# Patient Record
Sex: FEMALE | Race: WHITE | Employment: PART TIME | URBAN - METROPOLITAN AREA
[De-identification: names, ages, dates, MRNs, and addresses within clinical notes are randomized per-mention and may not be internally consistent; named-entity substitution may affect disease eponyms.]

---

## 2020-12-08 ENCOUNTER — VIRTUAL VISIT (OUTPATIENT)
Dept: FAMILY MEDICINE CLINIC | Age: 30
End: 2020-12-08

## 2020-12-08 PROCEDURE — 99203 OFFICE O/P NEW LOW 30 MIN: CPT | Performed by: NURSE PRACTITIONER

## 2020-12-08 RX ORDER — NIFEDIPINE 60 MG/1
60 TABLET, EXTENDED RELEASE ORAL DAILY
COMMUNITY
Start: 2020-11-15 | End: 2021-03-01 | Stop reason: SDUPTHER

## 2020-12-08 RX ORDER — FLUOXETINE 20 MG/1
20 TABLET, FILM COATED ORAL DAILY
COMMUNITY
Start: 2020-10-23 | End: 2020-12-08

## 2020-12-08 RX ORDER — LEVOTHYROXINE SODIUM 0.2 MG/1
200 TABLET ORAL DAILY
COMMUNITY
End: 2021-03-04 | Stop reason: SDUPTHER

## 2020-12-08 RX ORDER — LEVOTHYROXINE SODIUM 0.03 MG/1
TABLET ORAL
COMMUNITY
Start: 2020-11-25 | End: 2021-03-04

## 2020-12-08 RX ORDER — M-VIT,TX,IRON,MINS/CALC/FOLIC 27MG-0.4MG
1 TABLET ORAL DAILY
COMMUNITY
End: 2022-05-31

## 2020-12-08 RX ORDER — FLUOXETINE HYDROCHLORIDE 40 MG/1
40 CAPSULE ORAL DAILY
Qty: 30 CAPSULE | Refills: 3 | Status: SHIPPED | OUTPATIENT
Start: 2020-12-08 | End: 2021-03-04

## 2020-12-08 RX ORDER — TRAZODONE HYDROCHLORIDE 50 MG/1
50 TABLET ORAL NIGHTLY PRN
Qty: 30 TABLET | Refills: 5 | Status: SHIPPED | OUTPATIENT
Start: 2020-12-08 | End: 2021-05-19

## 2020-12-08 ASSESSMENT — PATIENT HEALTH QUESTIONNAIRE - PHQ9
SUM OF ALL RESPONSES TO PHQ9 QUESTIONS 1 & 2: 2
SUM OF ALL RESPONSES TO PHQ QUESTIONS 1-9: 2
SUM OF ALL RESPONSES TO PHQ QUESTIONS 1-9: 2
1. LITTLE INTEREST OR PLEASURE IN DOING THINGS: 2
SUM OF ALL RESPONSES TO PHQ QUESTIONS 1-9: 2
2. FEELING DOWN, DEPRESSED OR HOPELESS: 0

## 2020-12-08 ASSESSMENT — ENCOUNTER SYMPTOMS
ABDOMINAL PAIN: 0
SHORTNESS OF BREATH: 0

## 2020-12-08 NOTE — PROGRESS NOTES
2020    TELEHEALTH EVALUATION -- Audio/Visual (During OIDEQ-25 public health emergency)    HPI:    Linda Horta (:  [id number]) has requested an audio/video evaluation for the following concern(s):    Hypothyroidism  X 15 years  If misses 1-2 days TSH becomes abnormal ; has not missed any doses  Usually checks TSH 1x/month  Is on 225 mcg of synthroid  FH: Father, sisters have hypothyroidism    Preeclampsia  With 1st pregnancy- never went away after  Does not check BP at home- is good when comes in the office per patient  Takes procardia    Anxiety  Mother passed away (from OD)  when she was 17 yo; in [address] (in house rehab)  Dwells on things  Irrational fears  Any sickness automatically thinks is cancer  Panic attacks  Is going through a divorce- is afraid she is going to lose her kids  Thinks about this all day every day  Denies SI  Prozac- helps; x 1 month; wants to increase (is on 20 mg)- takes the edge off  Celexa- was not helping anymore  Previously was on Geodon    Review of Systems   Constitutional: Negative for activity change, appetite change, fatigue and fever. Respiratory: Negative for shortness of breath. Cardiovascular: Negative for chest pain. Gastrointestinal: Negative for abdominal pain. Neurological: Negative for dizziness and headaches. Psychiatric/Behavioral: Positive for sleep disturbance. Negative for suicidal ideas. The patient is nervous/anxious. Prior to Visit Medications    Medication Sig Taking?  Authorizing Provider   levothyroxine (SYNTHROID) 25 MCG tablet TK 1 T PO QD IN THE MORNING OES Yes Historical Provider, MD   NIFEdipine (PROCARDIA XL) 60 MG extended release tablet Take 60 mg by mouth daily Yes Historical Provider, MD   Ascorbic Acid (VITAMIN C PO) Take by mouth Yes Historical Provider, MD   Multiple Vitamins-Minerals (THERAPEUTIC MULTIVITAMIN-MINERALS) tablet Take 1 tablet by mouth daily Yes Historical Provider, MD   FLUoxetine (PROZAC) 40 MG capsule Take 1 capsule by mouth daily Yes SYLVIA Neal CNP   traZODone (DESYREL) 50 MG tablet Take 1 tablet by mouth nightly as needed for Sleep Yes SYLVIA Neal CNP   levothyroxine (SYNTHROID) 200 MCG tablet Take 200 mcg by mouth Daily Take 225 mcg daily Yes Historical Provider, MD       Social History     Tobacco Use    Smoking status: Never Smoker    Smokeless tobacco: Never Used   Substance Use Topics    Alcohol use: Yes    Drug use: Never            PHYSICAL EXAMINATION:  [ INSTRUCTIONS:  \"[x]\" Indicates a positive item  \"[]\" Indicates a negative item  -- DELETE ALL ITEMS NOT EXAMINED]  Vital Signs: (As obtained by patient/caregiver or practitioner observation)    Blood pressure-  Heart rate-    Respiratory rate-    Temperature-  Pulse oximetry-     Constitutional: [x] Appears well-developed and well-nourished [x] No apparent distress      [] Abnormal-   Mental status  [x] Alert and awake  [x] Oriented to person/place/time [x]Able to follow commands      Eyes:  EOM    []  Normal  [] Abnormal-  Sclera  []  Normal  [] Abnormal -         Discharge []  None visible  [] Abnormal -    HENT:   [x] Normocephalic, atraumatic.   [] Abnormal   [] Mouth/Throat: Mucous membranes are moist.     External Ears [] Normal  [] Abnormal-     Neck: [] No visualized mass     Pulmonary/Chest: [x] Respiratory effort normal.  [x] No visualized signs of difficulty breathing or respiratory distress        [] Abnormal-      Musculoskeletal:   [] Normal gait with no signs of ataxia         [] Normal range of motion of neck        [] Abnormal-       Neurological:        [] No Facial Asymmetry (Cranial nerve 7 motor function) (limited exam to video visit)          [] No gaze palsy        [] Abnormal-         Skin:        [] No significant exanthematous lesions or discoloration noted on facial skin         [] Abnormal-            Psychiatric:       [x] Normal Affect [] No Hallucinations        [] Abnormal- Other pertinent observable physical exam findings-     ASSESSMENT/PLAN:  1. Anxiety  Stable; Increase Prozac. - FLUoxetine (PROZAC) 40 MG capsule; Take 1 capsule by mouth daily  Dispense: 30 capsule; Refill: 3    2. Hypothyroidism, unspecified type  Stable;  Continue current regimen. TSH ordered. - TSH with Reflex; Future    3. Difficulty sleeping  Stable;  Continue current regimen. - traZODone (DESYREL) 50 MG tablet; Take 1 tablet by mouth nightly as needed for Sleep  Dispense: 30 tablet; Refill: 5    4. History of pre-eclampsia  Stable;  Continue current regimen. Return in about 4 weeks (around 1/5/2021). Blaze Porter is a 34 y.o. female being evaluated by a Virtual Visit (video visit) encounter to address concerns as mentioned above. A caregiver was present when appropriate. Due to this being a TeleHealth encounter ([facility]), evaluation of the following organ systems was limited: Vitals/Constitutional/EENT/Resp/CV/GI//MS/Neuro/Skin/Heme-Lymph-Imm. Pursuant to the emergency declaration under the [address] [place] [address] authority and the [employer] and [employer] Act, this Virtual Visit was conducted with patient's (and/or legal guardian's) consent, to reduce the patient's risk of exposure to COVID-19 and provide necessary medical care. The patient (and/or legal guardian) has also been advised to contact this office for worsening conditions or problems, and seek emergency medical treatment and/or call 911 if deemed necessary. Patient identification was verified at the start of the visit: Yes    Total time spent on this encounter: Not billed by time    Services were provided through a video synchronous discussion virtually to substitute for in-person clinic visit. Patient and provider were located at their individual homes.     --SYLVIA Krishnan - CNP on 12/8/2020 at 9:08 PM    An electronic signature was used to authenticate this note.

## 2020-12-14 DIAGNOSIS — E03.9 HYPOTHYROIDISM, UNSPECIFIED TYPE: ICD-10-CM

## 2020-12-14 LAB
T3 TOTAL: 1.21 NG/ML (ref 0.8–2)
T4 FREE: 1.6 NG/DL (ref 0.9–1.8)
TSH REFLEX: 0.2 UIU/ML (ref 0.27–4.2)

## 2021-01-19 ENCOUNTER — TELEPHONE (OUTPATIENT)
Dept: FAMILY MEDICINE CLINIC | Age: 31
End: 2021-01-19

## 2021-01-19 NOTE — TELEPHONE ENCOUNTER
----- Message from Chantal Caballero sent at 1/19/2021 11:52 AM EST -----  Subject: Message to Provider    QUESTIONS  Information for Provider? Patient is taking trazodone to help her sleep. Her grandmother is actively dying and she is not sleeping well with everything going on and is wanting to know if her dosage can be upped to a stronger dose.   ---------------------------------------------------------------------------  --------------  CALL BACK INFO  What is the best way for the office to contact you? OK to leave message on   voicemail  Preferred Call Back Phone Number? [phone]  ---------------------------------------------------------------------------  --------------  SCRIPT ANSWERS  Relationship to Patient?  Self

## 2021-02-05 ENCOUNTER — OFFICE VISIT (OUTPATIENT)
Dept: FAMILY MEDICINE CLINIC | Age: 31
End: 2021-02-05

## 2021-02-05 VITALS
WEIGHT: 293 LBS | OXYGEN SATURATION: 96 % | HEART RATE: 91 BPM | BODY MASS INDEX: 50.02 KG/M2 | SYSTOLIC BLOOD PRESSURE: 116 MMHG | HEIGHT: 64 IN | DIASTOLIC BLOOD PRESSURE: 84 MMHG

## 2021-02-05 DIAGNOSIS — F41.9 ANXIETY: Primary | ICD-10-CM

## 2021-02-05 DIAGNOSIS — R22.9 SKIN NODULE: ICD-10-CM

## 2021-02-05 DIAGNOSIS — G47.9 DIFFICULTY SLEEPING: ICD-10-CM

## 2021-02-05 DIAGNOSIS — L70.0 ACNE VULGARIS: ICD-10-CM

## 2021-02-05 PROCEDURE — 99213 OFFICE O/P EST LOW 20 MIN: CPT | Performed by: NURSE PRACTITIONER

## 2021-02-05 RX ORDER — FLUOXETINE HYDROCHLORIDE 20 MG/1
20 CAPSULE ORAL DAILY
Qty: 30 CAPSULE | Refills: 0 | Status: SHIPPED | OUTPATIENT
Start: 2021-02-05 | End: 2021-03-04

## 2021-02-05 RX ORDER — TRETINOIN 0.5 MG/G
CREAM TOPICAL
Qty: 20 G | Refills: 0 | Status: SHIPPED | OUTPATIENT
Start: 2021-02-05 | End: 2021-03-07

## 2021-02-05 SDOH — ECONOMIC STABILITY: FOOD INSECURITY: WITHIN THE PAST 12 MONTHS, THE FOOD YOU BOUGHT JUST DIDN'T LAST AND YOU DIDN'T HAVE MONEY TO GET MORE.: NEVER TRUE

## 2021-02-05 ASSESSMENT — PATIENT HEALTH QUESTIONNAIRE - PHQ9
SUM OF ALL RESPONSES TO PHQ QUESTIONS 1-9: 2
SUM OF ALL RESPONSES TO PHQ9 QUESTIONS 1 & 2: 2
2. FEELING DOWN, DEPRESSED OR HOPELESS: 1
1. LITTLE INTEREST OR PLEASURE IN DOING THINGS: 1

## 2021-02-05 NOTE — PROGRESS NOTES
Antony Benson (:  ) is a 31 y.o. female,Established patient, here for evaluation of the following chief complaint(s):  Acne, Insomnia, and Mass (inside right thigh )      ASSESSMENT/PLAN:  1. Anxiety  Stable; Ok to increase Prozac. Added 20 mg to 40 mg capsule since was just refilled. Will change next month to 3-20 mg capsules if dose is good for patient. 2. Difficulty sleeping  Stable;  Continue 100 mg trazodone. 3. Acne vulgaris  Stable;  Encouraged patient to use cetaphil to wash and spot treat with Retin-A cream.  -     tretinoin (RETIN-A) 0.05 % cream; Apply topically nightly., Disp-20 g, R-0, Normal  4. Skin nodule  Stable;  Discussed DD including lipoma. US ordered. -     US SOFT TISSUE LIMITED AREA; Future      Return in about 4 weeks (around 3/5/2021). SUBJECTIVE/OBJECTIVE:  HPI  A lot of stress: grandmother- was in the hospital x 3 weeks, intubated, COVID negative; came home and is now is at [employer] with kids (10 yo daughter and 2 yo son)- is a stressor  Would like to increase Prozac  Increased trazodone 100 mg nightly- has been helpful    Acne  Face  Can come to a white head  Acne wash and moisturizer  Neutotrogenia spot treatment    Back of right thigh  Does not stick out unless she feels for it  Not painful  No redness or drainage  Has had as long as she can remember    Review of Systems    Physical Exam  Vitals signs reviewed. Constitutional:       Appearance: Normal appearance. HENT:      Head: Normocephalic. Cardiovascular:      Rate and Rhythm: Normal rate and regular rhythm. Pulses: Normal pulses. Heart sounds: Normal heart sounds, S1 normal and S2 normal.   Pulmonary:      Effort: Pulmonary effort is normal.      Breath sounds: Normal breath sounds and air entry. Musculoskeletal:      Right lower leg: No edema. Left lower leg: No edema. Skin:     Findings: Acne present. Comments: Nodule palpated; not painful, borders delineated   Neurological:      Mental Status: She is alert. Psychiatric:         Mood and Affect: Mood normal.         An electronic signature was used to authenticate this note.     --SYLVIA Swift - CNP

## 2021-02-06 ENCOUNTER — HOSPITAL ENCOUNTER (OUTPATIENT)
Dept: ULTRASOUND IMAGING | Age: 31
Discharge: HOME | End: 2021-02-06

## 2021-02-06 DIAGNOSIS — R22.9 SKIN NODULE: ICD-10-CM

## 2021-02-06 PROCEDURE — 76999 ECHO EXAMINATION PROCEDURE: CPT

## 2021-02-17 ENCOUNTER — TELEPHONE (OUTPATIENT)
Dept: FAMILY MEDICINE CLINIC | Age: 31
End: 2021-02-17

## 2021-02-17 NOTE — TELEPHONE ENCOUNTER
Message  Received: Today  Message Contents   [facility]ane B[facility] [address] Support             Subject: Message to Provider     QUESTIONS   Information for Provider? pt wants to know if there are any other tests   that can be done such as a CTSCAN for the lump on her leg. She said    knows she has extreme anxiety and this is really bothering her   ---------------------------------------------------------------------------   --------------   CALL BACK INFO   What is the best way for the office to contact you? OK to leave message on   voicemail   Preferred Call Back Phone Number? [phone]   ---------------------------------------------------------------------------   --------------   SCRIPT ANSWERS   Relationship to Patient?  Self

## 2021-03-01 RX ORDER — FLUOXETINE HYDROCHLORIDE 20 MG/1
60 CAPSULE ORAL DAILY
Qty: 90 CAPSULE | Refills: 0 | Status: SHIPPED | OUTPATIENT
Start: 2021-03-01 | End: 2021-04-21 | Stop reason: SDUPTHER

## 2021-03-01 RX ORDER — NIFEDIPINE 60 MG/1
60 TABLET, EXTENDED RELEASE ORAL DAILY
Qty: 30 TABLET | Refills: 0 | Status: SHIPPED | OUTPATIENT
Start: 2021-03-01 | End: 2021-03-02

## 2021-03-01 NOTE — TELEPHONE ENCOUNTER
From Juan Pablo Cali:   Pt also said she would like to have the order for her MRI.   Please call and advise once completed.

## 2021-03-01 NOTE — TELEPHONE ENCOUNTER
1. Prozac 20mg - 3 capsules daily pending  Anxiety  Stable; Ok to increase Prozac. Added 20 mg to 40 mg capsule since was just refilled. Will change next month to 3-20 mg capsules if dose is good for patient. 2. Procardia pending    3.  Please advise on acne medication    (MRI message sent in separate message)

## 2021-03-02 RX ORDER — NIFEDIPINE 60 MG/1
60 TABLET, EXTENDED RELEASE ORAL DAILY
Qty: 90 TABLET | Refills: 0 | Status: SHIPPED | OUTPATIENT
Start: 2021-03-02 | End: 2021-03-04 | Stop reason: SDUPTHER

## 2021-03-03 NOTE — TELEPHONE ENCOUNTER
----- Message from Negrita De La Cruz sent at 3/3/2021 12:44 PM EST -----  Subject: Message to Provider    QUESTIONS  Information for Provider? Patient would like a return call regarding MRI referral and prescription for Synthroid. Would also like to get a prescription for Zofram as she has been nauseated. ---------------------------------------------------------------------------  --------------  Kristine REDDY  What is the best way for the office to contact you? OK to leave message on   voicemail  Preferred Call Back Phone Number? [phone]  ---------------------------------------------------------------------------  --------------  SCRIPT ANSWERS  Relationship to Patient?  Self

## 2021-03-04 DIAGNOSIS — R22.9 SKIN NODULE: Primary | ICD-10-CM

## 2021-03-04 RX ORDER — NIFEDIPINE 60 MG/1
60 TABLET, EXTENDED RELEASE ORAL DAILY
Qty: 90 TABLET | Refills: 0 | Status: SHIPPED | OUTPATIENT
Start: 2021-03-04 | End: 2021-11-11 | Stop reason: SDUPTHER

## 2021-03-04 RX ORDER — LEVOTHYROXINE SODIUM 0.2 MG/1
200 TABLET ORAL DAILY
Qty: 90 TABLET | Refills: 0 | Status: SHIPPED | OUTPATIENT
Start: 2021-03-04 | End: 2021-05-20 | Stop reason: SDUPTHER

## 2021-03-04 RX ORDER — ONDANSETRON 4 MG/1
4 TABLET, FILM COATED ORAL 3 TIMES DAILY PRN
Qty: 15 TABLET | Refills: 0 | Status: SHIPPED | OUTPATIENT
Start: 2021-03-04 | End: 2021-08-06 | Stop reason: ALTCHOICE

## 2021-03-04 NOTE — TELEPHONE ENCOUNTER
Discussed referral to general surgery for evaluation vs. MRI. Sent medications to pharmacy.   Patient aware

## 2021-03-16 PROBLEM — F41.9 ANXIETY: Status: ACTIVE | Noted: 2021-03-16

## 2021-03-16 PROBLEM — A60.00 GENITAL HSV: Status: ACTIVE | Noted: 2018-10-25

## 2021-03-16 PROBLEM — E03.9 HYPOTHYROIDISM: Status: ACTIVE | Noted: 2021-03-16

## 2021-03-16 PROBLEM — A60.00 GENITAL HSV: Status: RESOLVED | Noted: 2018-10-25 | Resolved: 2021-03-16

## 2021-03-18 ENCOUNTER — VIRTUAL VISIT (OUTPATIENT)
Dept: FAMILY MEDICINE CLINIC | Age: 31
End: 2021-03-18

## 2021-03-18 DIAGNOSIS — E03.9 HYPOTHYROIDISM, UNSPECIFIED TYPE: ICD-10-CM

## 2021-03-18 DIAGNOSIS — F41.9 ANXIETY: ICD-10-CM

## 2021-03-18 DIAGNOSIS — R53.83 FATIGUE, UNSPECIFIED TYPE: Primary | ICD-10-CM

## 2021-03-18 PROCEDURE — 99213 OFFICE O/P EST LOW 20 MIN: CPT | Performed by: NURSE PRACTITIONER

## 2021-03-18 NOTE — PROGRESS NOTES
Yaya Harper (:  ) is a 31 y.o. female,Established patient, here for evaluation of the following chief complaint(s): Fatigue      ASSESSMENT/PLAN:  1. Fatigue, unspecified type  Stable;  Labs ordered. Encouraged patient to stop melatonin as it might be lingering- slept better when just took trazodone.  -     TSH without Reflex; Future  -     Vitamin B12; Future  -     Vitamin D 25 Hydroxy; Future  -     CBC; Future  2. Hypothyroidism, unspecified type  Stable;  Recheck. 3. Anxiety  Stable;  Continue current regimen. No follow-ups on file. SUBJECTIVE/OBJECTIVE:  HPI  A lot of fatigue  Grandmother passed- grief  To get to sleep- takes melatonin, trazodone; sometimes one or the other- ? Melatonin is lingering  Forgets to take B12 supplement- eats meat; takes 1000 mcg- has been low in the past  No heavy menses or blood in her stool    Anxiety   Does not feel like it has been worse  They were there when she passed; has been sick in the hospital 2 weeks prior- does not think she is grieving like she thought she was going to ( of pneumonia, CHF, sepsis)  Mom  when she was 17 yo; 17 yo- went mentally crazy- hospitalized by SI  Takes 60 mg Prozac    Hypothyroidism  Takes synthroid  Hasn't had blood work completed- previously needed to decrease dose    Review of Systems    Patient-Reported Vitals 2020   Patient-Reported Weight 320 lb   Patient-Reported Height 5' 4\"        Physical Exam  Constitutional:       Appearance: Normal appearance. HENT:      Head: Normocephalic. Right Ear: External ear normal.      Left Ear: External ear normal.      Nose: Nose normal.   Pulmonary:      Effort: No respiratory distress. Neurological:      Mental Status: She is alert.    Psychiatric:         Attention and Perception: Attention normal.         Mood and Affect: Mood normal.         Speech: Speech normal.         Behavior: Behavior normal.         Hedy Khan, was evaluated through a synchronous (real-time) audio-video encounter. The patient (or guardian if applicable) is aware that this is a billable service. Verbal consent to proceed has been obtained within the past 12 months. The visit was conducted pursuant to the emergency declaration under the [address] [place] [address], [address] [place] [address] authority and the [employer] and [employer] Act. Patient identification was verified, and a caregiver was present when appropriate. The patient was located in a state where the provider was credentialed to provide care. An electronic signature was used to authenticate this note.     --Moise Pineda, SYLVIA - CNP

## 2021-03-22 DIAGNOSIS — R53.83 FATIGUE, UNSPECIFIED TYPE: ICD-10-CM

## 2021-03-23 LAB
HCT VFR BLD CALC: 39.3 % (ref 36–48)
HEMOGLOBIN: 13 G/DL (ref 12–16)
MCH RBC QN AUTO: 29.7 PG (ref 26–34)
MCHC RBC AUTO-ENTMCNC: 33.2 G/DL (ref 31–36)
MCV RBC AUTO: 89.5 FL (ref 80–100)
PDW BLD-RTO: 14.6 % (ref 12.4–15.4)
PLATELET # BLD: 183 K/UL (ref 135–450)
PMV BLD AUTO: 10 FL (ref 5–10.5)
RBC # BLD: 4.4 M/UL (ref 4–5.2)
TSH SERPL DL<=0.05 MIU/L-ACNC: 5.47 UIU/ML (ref 0.27–4.2)
VITAMIN B-12: 618 PG/ML (ref 211–911)
VITAMIN D 25-HYDROXY: 21.1 NG/ML
WBC # BLD: 6.3 K/UL (ref 4–11)

## 2021-03-24 ENCOUNTER — TELEPHONE (OUTPATIENT)
Dept: FAMILY MEDICINE CLINIC | Age: 31
End: 2021-03-24

## 2021-03-24 NOTE — TELEPHONE ENCOUNTER
TSH is now a little high. Has she changed how she is taking the medication? Is she taking 30 minutes before eating or drinking? I know last time her TSH was low, if she hasn't changed anything we need to add a little more medication.

## 2021-03-25 NOTE — TELEPHONE ENCOUNTER
Pt called back. Also wanted to know if she should have her a1c checked. States that it hasn't been checked in a while and there is a family hx of diabetes.

## 2021-03-25 NOTE — TELEPHONE ENCOUNTER
Pt is taking medication correctly  At night before she goes to bed  Pt feels extremely tired concerned she may have  mono she is so tired   Please advise

## 2021-03-25 NOTE — TELEPHONE ENCOUNTER
When she takes her medication at night is it at least 3 hours after her evening meal?  Is she ok if I increase? No, I did not check mono labs or A1c.   Does she want me to add?

## 2021-03-26 DIAGNOSIS — E03.9 HYPOTHYROIDISM, UNSPECIFIED TYPE: Primary | ICD-10-CM

## 2021-03-26 DIAGNOSIS — E03.9 HYPOTHYROIDISM, UNSPECIFIED TYPE: ICD-10-CM

## 2021-03-26 RX ORDER — LEVOTHYROXINE SODIUM 0.03 MG/1
12.5 TABLET ORAL DAILY
Qty: 15 TABLET | Refills: 1 | Status: SHIPPED | OUTPATIENT
Start: 2021-03-26 | End: 2021-03-26

## 2021-03-26 RX ORDER — LEVOTHYROXINE SODIUM 0.03 MG/1
TABLET ORAL
Qty: 45 TABLET | Refills: 0 | Status: SHIPPED | OUTPATIENT
Start: 2021-03-26 | End: 2021-06-01

## 2021-03-26 NOTE — TELEPHONE ENCOUNTER
Has never waited 3 hrs prior to taking.  - has been taking this medication for over 15 yrs this way. What are you recommendations? Should it be lowered? Wants to wait on the mono/a1c testing - will try the vitamin d first to see if it works.

## 2021-04-05 ENCOUNTER — TELEPHONE (OUTPATIENT)
Dept: FAMILY MEDICINE CLINIC | Age: 31
End: 2021-04-05

## 2021-04-06 DIAGNOSIS — R53.83 FATIGUE, UNSPECIFIED TYPE: Primary | ICD-10-CM

## 2021-04-06 NOTE — TELEPHONE ENCOUNTER
Pt is requesting glucose labs be done. Stated that she wanted to know if they needed to be done but would like to do it just to ease her mind.    Please advise pt   Phone # [phone]

## 2021-04-06 NOTE — TELEPHONE ENCOUNTER
I had previously discussed with patient and she wanted an A1C because of family history and her previous symptoms.

## 2021-04-06 NOTE — TELEPHONE ENCOUNTER
[phone] (home)   Left message for patient to call back. Hemoglobin A1C order is in her chart to have completed when convenient for her. Doesn't need to be fasting for this. Will need to clarify with the lab if she is wanting the tsh or a1c when she goes to ensure we get the right test completed.

## 2021-04-07 DIAGNOSIS — R53.83 FATIGUE, UNSPECIFIED TYPE: ICD-10-CM

## 2021-04-07 DIAGNOSIS — E03.9 HYPOTHYROIDISM, UNSPECIFIED TYPE: ICD-10-CM

## 2021-04-07 NOTE — TELEPHONE ENCOUNTER
[phone] (home)   Left message for patient to call back. Hemoglobin A1C order is in her chart to have completed when convenient for her. Doesn't need to be fasting for this.    Will need to clarify with the lab if she is wanting the tsh or a1c when she goes to ensure we get the right test completed

## 2021-04-08 LAB
ESTIMATED AVERAGE GLUCOSE: 105.4 MG/DL
HBA1C MFR BLD: 5.3 %

## 2021-04-15 ENCOUNTER — OFFICE VISIT (OUTPATIENT)
Dept: SURGERY | Age: 31
End: 2021-04-15

## 2021-04-15 VITALS — BODY MASS INDEX: 56.47 KG/M2 | SYSTOLIC BLOOD PRESSURE: 128 MMHG | DIASTOLIC BLOOD PRESSURE: 79 MMHG | WEIGHT: 293 LBS

## 2021-04-15 DIAGNOSIS — D17.23 LIPOMA OF RIGHT THIGH: Primary | ICD-10-CM

## 2021-04-15 PROCEDURE — 99242 OFF/OP CONSLTJ NEW/EST SF 20: CPT | Performed by: SURGERY

## 2021-04-15 NOTE — PROGRESS NOTES
TAKE WITH 200MCG TABLET 3/26/21   SYLVIA Mantilla CNP   ondansetron [facility]) 4 MG tablet Take 1 tablet by mouth 3 times daily as needed for Nausea or Vomiting 3/4/21   SYLVIA Mantilla CNP   NIFEdipine (PROCARDIA XL) 60 MG extended release tablet Take 1 tablet by mouth daily 3/4/21 4/3/21  SYLVIA Mantilla CNP   levothyroxine (SYNTHROID) 200 MCG tablet Take 1 tablet by mouth Daily 3/4/21   SYLVIA Mantilla CNP   FLUoxetine (PROZAC) 20 MG capsule Take 3 capsules by mouth daily 3/1/21 3/31/21  SYLVIA Mantilla CNP   Ascorbic Acid (VITAMIN C PO) Take by mouth    Historical Provider, MD   Multiple Vitamins-Minerals (THERAPEUTIC MULTIVITAMIN-MINERALS) tablet Take 1 tablet by mouth daily    Historical Provider, MD   traZODone (DESYREL) 50 MG tablet Take 1 tablet by mouth nightly as needed for Sleep 12/8/20   SYLVIA Mantilla CNP         No Known Allergies    Social History     Socioeconomic History    Marital status:      Spouse name: Not on file    Number of children: Not on file    Years of education: Not on file    Highest education level: Not on file   Occupational History    Not on file   Social Needs    Financial resource strain: Not hard at all    Food insecurity     Worry: Never true     Inability: Never true   [employer] needs     Medical: Not on file     Non-medical: Not on file   Tobacco Use    Smoking status: Never Smoker    Smokeless tobacco: Never Used   Substance and Sexual Activity    Alcohol use:  Yes    Drug use: Never    Sexual activity: Yes     Partners: Male   Lifestyle    Physical activity     Days per week: Not on file     Minutes per session: Not on file    Stress: Not on file   Relationships    Social connections     Talks on phone: Not on file     Gets together: Not on file     Attends [employer] service: Not on file     Active member of club or organization: Not on file     Attends meetings of clubs or organizations: Not on file     Relationship status: Not on file    Intimate partner violence     Fear of current or ex partner: Not on file     Emotionally abused: Not on file     Physically abused: Not on file     Forced sexual activity: Not on file   Other Topics Concern    Not on file   Social History Narrative    Not on file       Family History   Problem Relation Age of Onset    Obesity Mother     High Blood Pressure Mother     Heart Disease Father     Obesity Father     High Blood Pressure Father     Heart Disease Maternal Grandmother     Diabetes Maternal Grandmother     No Known Problems Maternal Grandfather     No Known Problems Paternal Grandmother     No Known Problems Paternal Grandfather        Review of Systems   Constitutional: Negative. Skin: Negative. All other systems reviewed and are negative. Objective:   Physical Exam  Vitals signs reviewed. Exam conducted with a chaperone present. Constitutional:       General: She is not in acute distress. Appearance: She is well-developed. She is not diaphoretic. HENT:      Head: Normocephalic and atraumatic. Right Ear: External ear normal.      Left Ear: External ear normal.      Nose: Nose normal.   Eyes:      General: No scleral icterus. Conjunctiva/sclera: Conjunctivae normal.   Neck:      Musculoskeletal: Normal range of motion and neck supple. Pulmonary:      Effort: Pulmonary effort is normal. No respiratory distress. Abdominal:      General: There is no distension. Palpations: Abdomen is soft. Musculoskeletal: Normal range of motion. Skin:     General: Skin is warm and dry. Findings: No erythema. Comments: 6 cm mobile SQ mass right posterior thigh. nontender   Neurological:      Mental Status: She is alert and oriented to person, place, and time. Psychiatric:         Behavior: Behavior normal.         Thought Content:  Thought content normal.         Judgment: Judgment

## 2021-04-19 ENCOUNTER — TELEPHONE (OUTPATIENT)
Dept: FAMILY MEDICINE CLINIC | Age: 31
End: 2021-04-19

## 2021-04-19 NOTE — TELEPHONE ENCOUNTER
Patient calling saying provider is wanting her to see a general surgeon. She went to see the surgeon and they said they are not worried about it, but since she has anxiety she is really worried about it and this is not stopping. She is requesting a CT scan to be ordered, even though she is self pay. Please call patient to advise.

## 2021-04-21 RX ORDER — FLUOXETINE HYDROCHLORIDE 20 MG/1
60 CAPSULE ORAL DAILY
Qty: 90 CAPSULE | Refills: 0 | Status: SHIPPED | OUTPATIENT
Start: 2021-04-21 | End: 2021-05-18 | Stop reason: SDUPTHER

## 2021-04-22 DIAGNOSIS — D17.23 LIPOMA OF RIGHT LOWER EXTREMITY: Primary | ICD-10-CM

## 2021-04-22 NOTE — TELEPHONE ENCOUNTER
Patient informed. She is having a lot of anxiety and panic attacks. Scheduled an appointment for tomorrow. She isn't sure if she should keep this appointment or if you could call her / change her medication to hopefully help with it. States that she curls up in a ball and cries. Last OV.    Anxiety   Does not feel like it has been worse  They were there when she passed; has been sick in the hospital 2 weeks prior- does not think she is grieving like she thought she was going to ( of pneumonia, CHF, sepsis)  Mom  when she was 17 yo; 17 yo- went mentally crazy- hospitalized by SI  Takes 60 mg Prozac

## 2021-04-22 NOTE — TELEPHONE ENCOUNTER
I would have her keep her appointment. She can increase the Prozac to 80 mg daily. We might need to talk about something for her panic attacks.   Thanks

## 2021-04-23 ENCOUNTER — TELEPHONE (OUTPATIENT)
Dept: FAMILY MEDICINE CLINIC | Age: 31
End: 2021-04-23

## 2021-04-23 DIAGNOSIS — D17.23 LIPOMA OF RIGHT LOWER EXTREMITY: Primary | ICD-10-CM

## 2021-04-29 ENCOUNTER — OFFICE VISIT (OUTPATIENT)
Dept: FAMILY MEDICINE CLINIC | Age: 31
End: 2021-04-29

## 2021-04-29 VITALS
WEIGHT: 293 LBS | SYSTOLIC BLOOD PRESSURE: 126 MMHG | DIASTOLIC BLOOD PRESSURE: 62 MMHG | HEIGHT: 64 IN | BODY MASS INDEX: 50.02 KG/M2

## 2021-04-29 DIAGNOSIS — F41.9 ANXIETY: Primary | ICD-10-CM

## 2021-04-29 DIAGNOSIS — D17.23 LIPOMA OF RIGHT LOWER EXTREMITY: ICD-10-CM

## 2021-04-29 PROCEDURE — 99213 OFFICE O/P EST LOW 20 MIN: CPT | Performed by: NURSE PRACTITIONER

## 2021-04-29 RX ORDER — FLUTICASONE PROPIONATE 50 MCG
1-2 SPRAY, SUSPENSION (ML) NASAL DAILY
Qty: 1 BOTTLE | Refills: 1 | Status: SHIPPED | OUTPATIENT
Start: 2021-04-29 | End: 2021-04-29

## 2021-04-29 RX ORDER — FLUTICASONE PROPIONATE 50 MCG
SPRAY, SUSPENSION (ML) NASAL
Qty: 48 G | Refills: 0 | Status: SHIPPED | OUTPATIENT
Start: 2021-04-29 | End: 2021-08-06 | Stop reason: ALTCHOICE

## 2021-04-29 ASSESSMENT — PATIENT HEALTH QUESTIONNAIRE - PHQ9
1. LITTLE INTEREST OR PLEASURE IN DOING THINGS: 1
SUM OF ALL RESPONSES TO PHQ QUESTIONS 1-9: 2
SUM OF ALL RESPONSES TO PHQ QUESTIONS 1-9: 2
2. FEELING DOWN, DEPRESSED OR HOPELESS: 1

## 2021-04-29 NOTE — PROGRESS NOTES
Alexandr Love (:  ) is a 31 y.o. female,Established patient, here for evaluation of the following chief complaint(s): Anxiety      ASSESSMENT/PLAN:  1. Anxiety  Stable;  Patient to look at dosing of Prozac. If only taking 20 mg ok to increase to 40 mg x 1 week and then 60 mg x 1 week. Could consider increasing to 80 mg. Patient does not want to try any other medications for panic. 2. Lipoma of right lower extremity  Stable;  Patient scheduled for MRI. No follow-ups on file. SUBJECTIVE/OBJECTIVE:  HPI   Anxiety  Fixates on medical issues; always worries about cancer  Is driving her insane  Is always tired- is not unusual for her  Is grieving the loss of her grandmother  No motivation  Panic attacks- it snowballs in her head  Thinks about her kids and what would happen if something happened to her  ? Taking 1 big capsule    Denies SI    Lipoma  Saw general surgery  Still wants MRI to ease anxiety    Review of Systems    Physical Exam  Vitals signs reviewed. Constitutional:       Appearance: Normal appearance. HENT:      Head: Normocephalic. Right Ear: External ear normal.      Left Ear: External ear normal.   Cardiovascular:      Rate and Rhythm: Normal rate and regular rhythm. Pulses: Normal pulses. Heart sounds: Normal heart sounds, S1 normal and S2 normal.   Pulmonary:      Effort: Pulmonary effort is normal.      Breath sounds: Normal breath sounds and air entry. Neurological:      Mental Status: She is alert. Psychiatric:         Mood and Affect: Mood is anxious. Thought Content: Thought content does not include suicidal ideation. Thought content does not include suicidal plan. An electronic signature was used to authenticate this note.     --SYLVIA Honeycutt - CNP

## 2021-05-13 ENCOUNTER — HOSPITAL ENCOUNTER (OUTPATIENT)
Dept: MRI IMAGING | Age: 31
Discharge: HOME | End: 2021-05-13

## 2021-05-13 DIAGNOSIS — D17.23 LIPOMA OF RIGHT LOWER EXTREMITY: ICD-10-CM

## 2021-05-13 PROCEDURE — 73718 MRI LOWER EXTREMITY W/O DYE: CPT

## 2021-05-18 RX ORDER — FLUOXETINE HYDROCHLORIDE 20 MG/1
60 CAPSULE ORAL DAILY
Qty: 90 CAPSULE | Refills: 0 | Status: SHIPPED | OUTPATIENT
Start: 2021-05-18 | End: 2021-08-06 | Stop reason: DRUGHIGH

## 2021-05-18 NOTE — TELEPHONE ENCOUNTER
Last OV 4/29/2021   Next OV Visit date not found  Last fill 4/21/2021    Requested Prescriptions     Pending Prescriptions Disp Refills    FLUoxetine (PROZAC) 20 MG capsule 90 capsule 0     Sig: Take 3 capsules by mouth daily

## 2021-05-19 DIAGNOSIS — G47.9 DIFFICULTY SLEEPING: ICD-10-CM

## 2021-05-19 RX ORDER — TRAZODONE HYDROCHLORIDE 50 MG/1
50 TABLET ORAL NIGHTLY PRN
Qty: 30 TABLET | Refills: 2 | Status: SHIPPED | OUTPATIENT
Start: 2021-05-19 | End: 2021-09-13

## 2021-05-20 RX ORDER — LEVOTHYROXINE SODIUM 0.2 MG/1
200 TABLET ORAL DAILY
Qty: 90 TABLET | Refills: 0 | Status: SHIPPED | OUTPATIENT
Start: 2021-05-20 | End: 2021-09-01

## 2021-05-20 NOTE — TELEPHONE ENCOUNTER
Last OV 4/29/2021   Next OV Visit date not found  Last fill 3/4/2021    Requested Prescriptions     Pending Prescriptions Disp Refills    levothyroxine (SYNTHROID) 200 MCG tablet 90 tablet 0     Sig: Take 1 tablet by mouth Daily

## 2021-05-24 ENCOUNTER — HOSPITAL ENCOUNTER (OUTPATIENT)
Dept: MRI IMAGING | Age: 31
Discharge: HOME | End: 2021-05-24

## 2021-05-24 DIAGNOSIS — D17.23 LIPOMA OF RIGHT LOWER EXTREMITY: ICD-10-CM

## 2021-05-24 PROCEDURE — 6360000004 HC RX CONTRAST MEDICATION: Performed by: NURSE PRACTITIONER

## 2021-05-24 PROCEDURE — A9579 GAD-BASE MR CONTRAST NOS,1ML: HCPCS | Performed by: NURSE PRACTITIONER

## 2021-05-24 PROCEDURE — 73719 MRI LOWER EXTREMITY W/DYE: CPT

## 2021-05-24 RX ADMIN — GADOTERIDOL 20 ML: 279.3 INJECTION, SOLUTION INTRAVENOUS at 14:14

## 2021-05-28 DIAGNOSIS — E03.9 HYPOTHYROIDISM, UNSPECIFIED TYPE: ICD-10-CM

## 2021-05-28 NOTE — TELEPHONE ENCOUNTER
Last OV 4/29/2021   Next OV Visit date not found    Requested Prescriptions     Pending Prescriptions Disp Refills    levothyroxine (SYNTHROID) 25 MCG tablet [Pharmacy Med Name: LEVOTHYROXINE 0.025MG (25MCG) TAB] 45 tablet 0     Sig: TAKE ONE-HALF TABLET BY MOUTH DAILY.  TAKE WITH 200MCG TABLET

## 2021-06-01 ENCOUNTER — TELEPHONE (OUTPATIENT)
Dept: FAMILY MEDICINE CLINIC | Age: 31
End: 2021-06-01

## 2021-06-01 DIAGNOSIS — E03.9 HYPOTHYROIDISM, UNSPECIFIED TYPE: Primary | ICD-10-CM

## 2021-06-01 DIAGNOSIS — D17.23 LIPOMA OF RIGHT LOWER EXTREMITY: ICD-10-CM

## 2021-06-01 RX ORDER — LEVOTHYROXINE SODIUM 0.03 MG/1
TABLET ORAL
Qty: 45 TABLET | Refills: 0 | Status: SHIPPED | OUTPATIENT
Start: 2021-06-01 | End: 2021-06-18

## 2021-06-01 NOTE — TELEPHONE ENCOUNTER
TSH ordered    We have drawn her vitamin B12, vitamin D and CBC all were normal.    Does she think her fatigue is related to depression or anxiety?   Thanks

## 2021-06-01 NOTE — TELEPHONE ENCOUNTER
Pt stated that she is due for her thyroids to be checked. Also pt is extremely drowsy  And would like to have orders to be put in to see if she can find the cause of why she's so tired.      Please advise pt     Phone # [phone]

## 2021-06-03 DIAGNOSIS — E03.9 HYPOTHYROIDISM, UNSPECIFIED TYPE: ICD-10-CM

## 2021-06-03 LAB — TSH SERPL DL<=0.05 MIU/L-ACNC: 1.02 UIU/ML (ref 0.27–4.2)

## 2021-06-03 NOTE — TELEPHONE ENCOUNTER
Pt rc.  Advised pt that new orders for TSH has been ordered. Pt will complete. Pt doesn't feel like it could possibly be anxiety or depression. She still feels like it could be her thyroid.

## 2021-06-18 DIAGNOSIS — E03.9 HYPOTHYROIDISM, UNSPECIFIED TYPE: ICD-10-CM

## 2021-06-18 RX ORDER — LEVOTHYROXINE SODIUM 0.03 MG/1
TABLET ORAL
Qty: 45 TABLET | Refills: 0 | Status: SHIPPED | OUTPATIENT
Start: 2021-06-18 | End: 2021-09-13

## 2021-08-02 ENCOUNTER — NURSE TRIAGE (OUTPATIENT)
Dept: OTHER | Facility: CLINIC | Age: 31
End: 2021-08-02

## 2021-08-02 NOTE — TELEPHONE ENCOUNTER
Received call from [facility] at [facility] with Red Flag Complaint. Brief description of triage: BLE edema after prolonged travel and now chest pain x 4 days    Triage indicates for patient to go to ED now    Care advice provided, patient verbalizes understanding; denies any other questions or concerns; instructed to call back for any new or worsening symptoms. Attention Provider: Thank you for allowing me to participate in the care of your patient. The patient was connected to triage in response to information provided to the [facility]. Please do not respond through this encounter as the response is not directed to a shared pool. Reason for Disposition   Long-distance travel in past month (e.g., car, bus, train, plane; with trip lasting 6 or more hours)    Answer Assessment - Initial Assessment Questions  1. LOCATION: \"Where does it hurt? \"        Left side    2. RADIATION: \"Does the pain go anywhere else? \" (e.g., into neck, jaw, arms, back)      No    3. ONSET: \"When did the chest pain begin? \" (Minutes, hours or days)       \"maybe 4 days\"    4. PATTERN \"Does the pain come and go, or has it been constant since it started? \"  \"Does it get worse with exertion? \"       Comes and goes    5. DURATION: \"How long does it last\" (e.g., seconds, minutes, hours)      5 min    6. SEVERITY: \"How bad is the pain? \"  (e.g., Scale 1-10; mild, moderate, or severe)     - MILD (1-3): doesn't interfere with normal activities      - MODERATE (4-7): interferes with normal activities or awakens from sleep     - SEVERE (8-10): excruciating pain, unable to do any normal activities        5/10    7. CARDIAC RISK FACTORS: \"Do you have any history of heart problems or risk factors for heart disease? \" (e.g., angina, prior heart attack; diabetes, high blood pressure, high cholesterol, smoker, or strong family history of heart disease)      htn     8. PULMONARY RISK FACTORS: \"Do you have any history of lung disease? \"  (e.g., blood clots in lung, asthma, emphysema, birth control pills)      Asthma as child    9. CAUSE: \"What do you think is causing the chest pain? \"      GERD    10. OTHER SYMPTOMS: \"Do you have any other symptoms? \" (e.g., dizziness, nausea, vomiting, sweating, fever, difficulty breathing, cough)        Stressed, \"high anxiety\", feels like she may have sleep apnea but that is better    11. PREGNANCY: \"Is there any chance you are pregnant? \" \"When was your last menstrual period? \"        No    Protocols used: CHEST PAIN-ADULT-[place]

## 2021-08-06 ENCOUNTER — VIRTUAL VISIT (OUTPATIENT)
Dept: FAMILY MEDICINE CLINIC | Age: 31
End: 2021-08-06

## 2021-08-06 DIAGNOSIS — G47.10 HYPERSOMNIA: ICD-10-CM

## 2021-08-06 DIAGNOSIS — K21.9 GASTROESOPHAGEAL REFLUX DISEASE WITHOUT ESOPHAGITIS: Primary | ICD-10-CM

## 2021-08-06 PROCEDURE — 99213 OFFICE O/P EST LOW 20 MIN: CPT | Performed by: NURSE PRACTITIONER

## 2021-08-06 RX ORDER — FLUOXETINE 20 MG/1
40 TABLET, FILM COATED ORAL DAILY
Qty: 180 TABLET | Refills: 0 | Status: SHIPPED | OUTPATIENT
Start: 2021-08-06 | End: 2021-11-11

## 2021-08-06 RX ORDER — DICYCLOMINE HYDROCHLORIDE 10 MG/1
CAPSULE ORAL
COMMUNITY
Start: 2021-08-04 | End: 2022-05-31

## 2021-08-06 RX ORDER — FLUOXETINE HYDROCHLORIDE 40 MG/1
40 CAPSULE ORAL DAILY
COMMUNITY
End: 2021-08-06

## 2021-08-06 NOTE — PROGRESS NOTES
Fanta Raymond (:  ) is a 31 y.o. female,Established patient, here for evaluation of the following chief complaint(s): Follow-Up from Hospital         ASSESSMENT/PLAN:  1. Gastroesophageal reflux disease without esophagitis  Stable;  Continue prevacid and pepcid. If symptoms persisting will refer to GI for repeat EGD. 2. Hypersomnia  Stable;  Referral to sleep medicine. -     Idris Balbuena MD, Sleep Medicine, [facility]      No follow-ups on file. SUBJECTIVE/OBJECTIVE:  HPI   GERD  Chest pain- left sided  Improved in the past 2 days- felt last week and weekend  Has always had acid reflux- takes prevacid and pepcid and goes away  Contantly has regurgitation  Had traveled- d-dimer, cxr, ekg- all negative  Anxiety- worries about esophageal cancer  Hx of EGD    Hypersomnia  Sleep apnea runs in the family  In the AM feels shortness of breath, loss of breath, always has a dry cough- feels a tickle in the back of her throat, + daytime sleepiness    Is going to see a therapist on Tuesday    Review of Systems    Patient-Reported Vitals 2020   Patient-Reported Weight 320 lb   Patient-Reported Height 5' 4\"        Physical Exam  Constitutional:       Appearance: Normal appearance. HENT:      Head: Normocephalic. Right Ear: External ear normal.      Left Ear: External ear normal.      Nose: Nose normal.   Pulmonary:      Effort: No respiratory distress. Neurological:      Mental Status: She is alert. Psychiatric:         Mood and Affect: Mood normal.         Hedy Khan, was evaluated through a synchronous (real-time) audio-video encounter. The patient (or guardian if applicable) is aware that this is a billable service. Verbal consent to proceed has been obtained within the past 12 months.  The visit was conducted pursuant to the emergency declaration under the [address] [facility] [place], [address]5 waiver authority and the [facility] and Response Supplemental Appropriations Act. Patient identification was verified, and a caregiver was present when appropriate. The patient was located in a state where the provider was credentialed to provide care. An electronic signature was used to authenticate this note.     --SYLVIA Butler - CNP

## 2021-09-01 RX ORDER — LEVOTHYROXINE SODIUM 0.2 MG/1
200 TABLET ORAL DAILY
Qty: 90 TABLET | Refills: 0 | Status: SHIPPED | OUTPATIENT
Start: 2021-09-01 | End: 2021-09-20 | Stop reason: SDUPTHER

## 2021-09-01 NOTE — TELEPHONE ENCOUNTER
Medication:   Requested Prescriptions     Pending Prescriptions Disp Refills    levothyroxine (SYNTHROID) 200 MCG tablet [Pharmacy Med Name: LEVOTHYROXINE 0.2MG (200MCG) TAB] 90 tablet 0     Sig: TAKE 1 TABLET BY MOUTH DAILY        Last Filled:  5/20/21  Last appt: 8/6/2021   Next appt: NONE

## 2021-09-12 DIAGNOSIS — E03.9 HYPOTHYROIDISM, UNSPECIFIED TYPE: ICD-10-CM

## 2021-09-12 DIAGNOSIS — G47.9 DIFFICULTY SLEEPING: ICD-10-CM

## 2021-09-13 RX ORDER — TRAZODONE HYDROCHLORIDE 50 MG/1
50 TABLET ORAL NIGHTLY PRN
Qty: 30 TABLET | Refills: 0 | Status: SHIPPED | OUTPATIENT
Start: 2021-09-13 | End: 2021-11-01

## 2021-09-13 RX ORDER — LEVOTHYROXINE SODIUM 0.03 MG/1
TABLET ORAL
Qty: 45 TABLET | Refills: 0 | Status: SHIPPED | OUTPATIENT
Start: 2021-09-13 | End: 2021-09-20 | Stop reason: SDUPTHER

## 2021-09-13 NOTE — TELEPHONE ENCOUNTER
Medication:   Requested Prescriptions     Pending Prescriptions Disp Refills    levothyroxine (SYNTHROID) 25 MCG tablet [Pharmacy Med Name: LEVOTHYROXINE 0.025MG (25MCG) TAB] 45 tablet 0     Sig: TAKE ONE-HALF TABLET BY MOUTH DAILY. TAKE WITH 200MCG TABLET    traZODone (DESYREL) 50 MG tablet [Pharmacy Med Name: TRAZODONE 50MG TABLETS] 30 tablet 2     Sig: TAKE 1 TABLET BY MOUTH NIGHTLY AS NEEDED FOR SLEEP        Last Filled: can you please verify synthroid dose? There are two different ones in the chart.    Last appt: 8/6/2021   Next appt: none

## 2021-09-20 DIAGNOSIS — E03.9 HYPOTHYROIDISM, UNSPECIFIED TYPE: ICD-10-CM

## 2021-09-20 RX ORDER — LEVOTHYROXINE SODIUM 0.03 MG/1
TABLET ORAL
Qty: 45 TABLET | Refills: 0 | Status: SHIPPED | OUTPATIENT
Start: 2021-09-20 | End: 2021-11-11 | Stop reason: SDUPTHER

## 2021-09-20 RX ORDER — LEVOTHYROXINE SODIUM 0.2 MG/1
200 TABLET ORAL DAILY
Qty: 90 TABLET | Refills: 0 | Status: SHIPPED | OUTPATIENT
Start: 2021-09-20 | End: 2021-11-11 | Stop reason: SDUPTHER

## 2021-09-20 NOTE — TELEPHONE ENCOUNTER
----- Message from Magi Ata sent at 9/20/2021  3:39 PM EDT -----  Subject: Refill Request    QUESTIONS  Name of Medication? levothyroxine (SYNTHROID) 200 MCG tablet  Patient-reported dosage and instructions? 200 MG once daily  How many days do you have left? 0  Preferred Pharmacy? [facility] #[id number]  Pharmacy phone number (if available)? [phone]  ---------------------------------------------------------------------------  --------------,  Name of Medication? levothyroxine (SYNTHROID) 25 MCG tablet  Patient-reported dosage and instructions? 25 MG once a day  How many days do you have left? 30  Preferred Pharmacy? [facility] #[id number]  Pharmacy phone number (if available)? [phone]  ---------------------------------------------------------------------------  --------------  [employer] INFO  What is the best way for the office to contact you? OK to leave message on   voicemail  Preferred Call Back Phone Number?  [phone]

## 2021-10-30 DIAGNOSIS — G47.9 DIFFICULTY SLEEPING: ICD-10-CM

## 2021-11-01 DIAGNOSIS — G47.9 DIFFICULTY SLEEPING: ICD-10-CM

## 2021-11-01 RX ORDER — TRAZODONE HYDROCHLORIDE 50 MG/1
50 TABLET ORAL NIGHTLY PRN
Qty: 30 TABLET | Refills: 0 | Status: SHIPPED | OUTPATIENT
Start: 2021-11-01 | End: 2021-11-29

## 2021-11-01 RX ORDER — TRAZODONE HYDROCHLORIDE 50 MG/1
50 TABLET ORAL NIGHTLY PRN
Qty: 30 TABLET | Refills: 0 | OUTPATIENT
Start: 2021-11-01

## 2021-11-01 NOTE — TELEPHONE ENCOUNTER
----- Message from Joshua Lam sent at 11/1/2021  2:57 PM EDT -----  Subject: Refill Request    QUESTIONS  Name of Medication? traZODone (DESYREL) 50 MG tablet  Patient-reported dosage and instructions? 1x day at night  How many days do you have left? 0  Preferred Pharmacy? [facility] #[id number]  Pharmacy phone number (if available)? [phone]  Additional Information for Provider? Please refill   ---------------------------------------------------------------------------  --------------  CALL BACK INFO  What is the best way for the office to contact you? OK to leave message on   voicemail  Preferred Call Back Phone Number?  [phone]

## 2021-11-09 ENCOUNTER — TELEPHONE (OUTPATIENT)
Dept: FAMILY MEDICINE CLINIC | Age: 31
End: 2021-11-09

## 2021-11-09 NOTE — TELEPHONE ENCOUNTER
----- Message from Mindy Brunson sent at 11/9/2021  2:34 PM EST -----  Subject: Message to Provider    QUESTIONS  Information for Provider? Pt would like some blood work put in for her   thyroid since she is getting her cycle every 2 weeks and would like a call   back about this issue   ---------------------------------------------------------------------------  --------------  CALL BACK INFO  What is the best way for the office to contact you? OK to leave message on   voicemail  Preferred Call Back Phone Number? [phone]  ---------------------------------------------------------------------------  --------------  SCRIPT ANSWERS  Relationship to Patient?  Self

## 2021-11-10 DIAGNOSIS — E03.9 HYPOTHYROIDISM, UNSPECIFIED TYPE: ICD-10-CM

## 2021-11-10 NOTE — TELEPHONE ENCOUNTER
Procardia 60 mg  Last OV 8/6/2021   Next OV 11/11/2021  Last Fill 03/04/2021      Levothyroxine 25 mcg  Last OV 8/6/2021   Next OV 11/11/2021  Last Fill 09/20/2021      Levothyroxine 200 mcg  Last OV 8/6/2021   Next OV 11/11/2021  Last Fill 09/20/2021

## 2021-11-10 NOTE — TELEPHONE ENCOUNTER
Pt stated she is completely out of Procardia.        NIFEdipine (PROCARDIA XL) 60 MG extended release tablet ()    levothyroxine (SYNTHROID) 25 MCG tablet    levothyroxine (SYNTHROID) 200 MCG tablet    [facility] #[id number]

## 2021-11-11 ENCOUNTER — OFFICE VISIT (OUTPATIENT)
Dept: FAMILY MEDICINE CLINIC | Age: 31
End: 2021-11-11

## 2021-11-11 VITALS
RESPIRATION RATE: 16 BRPM | TEMPERATURE: 97.7 F | OXYGEN SATURATION: 97 % | WEIGHT: 293 LBS | DIASTOLIC BLOOD PRESSURE: 80 MMHG | BODY MASS INDEX: 50.02 KG/M2 | HEIGHT: 64 IN | HEART RATE: 71 BPM | SYSTOLIC BLOOD PRESSURE: 130 MMHG

## 2021-11-11 DIAGNOSIS — I10 HYPERTENSION, UNSPECIFIED TYPE: ICD-10-CM

## 2021-11-11 DIAGNOSIS — N92.6 MENSTRUAL IRREGULARITY: ICD-10-CM

## 2021-11-11 DIAGNOSIS — E03.9 HYPOTHYROIDISM, UNSPECIFIED TYPE: Primary | ICD-10-CM

## 2021-11-11 DIAGNOSIS — E03.9 HYPOTHYROIDISM, UNSPECIFIED TYPE: ICD-10-CM

## 2021-11-11 DIAGNOSIS — F41.9 ANXIETY: ICD-10-CM

## 2021-11-11 LAB — TSH SERPL DL<=0.05 MIU/L-ACNC: 1.23 UIU/ML (ref 0.27–4.2)

## 2021-11-11 PROCEDURE — 99213 OFFICE O/P EST LOW 20 MIN: CPT | Performed by: NURSE PRACTITIONER

## 2021-11-11 RX ORDER — FLUOXETINE HYDROCHLORIDE 60 MG/1
60 TABLET, FILM COATED ORAL; ORAL DAILY
Qty: 90 TABLET | Refills: 2 | Status: SHIPPED | OUTPATIENT
Start: 2021-11-11 | End: 2021-12-30 | Stop reason: SDUPTHER

## 2021-11-11 RX ORDER — NIFEDIPINE 60 MG/1
60 TABLET, EXTENDED RELEASE ORAL DAILY
Qty: 90 TABLET | Refills: 0 | Status: SHIPPED | OUTPATIENT
Start: 2021-11-11 | End: 2022-02-21

## 2021-11-11 RX ORDER — LEVOTHYROXINE SODIUM 0.03 MG/1
TABLET ORAL
Qty: 45 TABLET | Refills: 0 | Status: SHIPPED | OUTPATIENT
Start: 2021-11-11 | End: 2021-12-30 | Stop reason: SDUPTHER

## 2021-11-11 RX ORDER — LEVOTHYROXINE SODIUM 0.2 MG/1
200 TABLET ORAL DAILY
Qty: 90 TABLET | Refills: 0 | Status: SHIPPED | OUTPATIENT
Start: 2021-11-11 | End: 2021-12-30 | Stop reason: SDUPTHER

## 2021-11-11 NOTE — PROGRESS NOTES
Agustin Guerrero (:  ) is a 31 y.o. female,Established patient, here for evaluation of the following chief complaint(s):  Hypothyroidism (talk about her thyroid being off)         ASSESSMENT/PLAN:  1. Hypothyroidism, unspecified type  Stable;  Recheck TSH. Continue current regimen.  -     TSH without Reflex; Future  2. Menstrual irregularity  Stable;  See 1. If persisting encouraged patient to f/u with gynecology. 3. Anxiety  Stable; Not progressing- increase Prozac. 4. Hypertension, unspecified type  Stable;  Continue current regimen. Return in about 4 weeks (around 2021). Subjective   SUBJECTIVE/OBJECTIVE:  HPI   Hypothyroidism/ irregular menses  Past 2 months has been having menses q 2 weeks x 4-5 days  [place] had bleeding after intercourse; had a pelvic US- normal; thyroid was off- was causing the bleeding  Has not changed how she is taking her thyroid medication- has not run out and no change in   No weight gain  No dry skin  No constipation  Is taking 225 mcg    Anxiety  Is always  Has gotten better lately  Thinks going to 60 mg will help    HYPERTENSION  BP is good  No concerns with procardia    Review of Systems       Objective   Physical Exam  Vitals reviewed. Constitutional:       Appearance: Normal appearance. HENT:      Head: Normocephalic. Right Ear: External ear normal.      Left Ear: External ear normal.   Cardiovascular:      Rate and Rhythm: Normal rate and regular rhythm. Pulses: Normal pulses. Heart sounds: Normal heart sounds, S1 normal and S2 normal.   Pulmonary:      Effort: Pulmonary effort is normal.      Breath sounds: Normal breath sounds and air entry. Musculoskeletal:      Right lower leg: No edema. Left lower leg: No edema. Neurological:      Mental Status: She is alert.    Psychiatric:         Mood and Affect: Mood normal.       An electronic signature was used to authenticate this note.    --Sakina Garcia, APRN - CNP

## 2021-11-29 DIAGNOSIS — G47.9 DIFFICULTY SLEEPING: ICD-10-CM

## 2021-11-29 RX ORDER — TRAZODONE HYDROCHLORIDE 50 MG/1
50 TABLET ORAL NIGHTLY PRN
Qty: 30 TABLET | Refills: 0 | Status: SHIPPED | OUTPATIENT
Start: 2021-11-29 | End: 2021-12-20 | Stop reason: SDUPTHER

## 2021-11-29 NOTE — TELEPHONE ENCOUNTER
Medication:   Requested Prescriptions     Pending Prescriptions Disp Refills    traZODone (DESYREL) 50 MG tablet [Pharmacy Med Name: TRAZODONE 50MG TABLETS] 30 tablet 0     Sig: TAKE 1 TABLET BY MOUTH NIGHTLY AS NEEDED FOR SLEEP        Last Filled:      Patient Phone Number: [phone] (home)     Last appt: 11/11/2021   Next appt: Visit date not found    Last OARRS: No flowsheet data found.

## 2021-12-20 DIAGNOSIS — G47.9 DIFFICULTY SLEEPING: ICD-10-CM

## 2021-12-20 RX ORDER — TRAZODONE HYDROCHLORIDE 50 MG/1
50 TABLET ORAL NIGHTLY PRN
Qty: 30 TABLET | Refills: 0 | Status: SHIPPED | OUTPATIENT
Start: 2021-12-20 | End: 2021-12-30 | Stop reason: SDUPTHER

## 2021-12-20 NOTE — TELEPHONE ENCOUNTER
Medication:   Requested Prescriptions      No prescriptions requested or ordered in this encounter        Last Filled:      Patient Phone Number: [phone] (home)     Last appt: 11/11/2021   Next appt: Visit date not found    Last OARRS: No flowsheet data found.

## 2021-12-29 DIAGNOSIS — E03.9 HYPOTHYROIDISM, UNSPECIFIED TYPE: ICD-10-CM

## 2021-12-29 DIAGNOSIS — G47.9 DIFFICULTY SLEEPING: ICD-10-CM

## 2021-12-29 NOTE — TELEPHONE ENCOUNTER
traZODone (DESYREL) 50 MG tablet - pt stated she was on 50 mg was told she could take 100 mg pt is now almost out oif the medication. levothyroxine (SYNTHROID) 200 MCG tablet  levothyroxine (SYNTHROID) 25 MCG tablet    FLUoxetine 60 MG TABS - Pt stated she is taking capsule and they don't sit well with her stomach and would like to try and get a tablet instead.      Please send to [address]

## 2021-12-30 RX ORDER — TRAZODONE HYDROCHLORIDE 50 MG/1
50 TABLET ORAL NIGHTLY PRN
Qty: 30 TABLET | Refills: 0 | Status: SHIPPED | OUTPATIENT
Start: 2021-12-30 | End: 2022-01-24

## 2021-12-30 RX ORDER — LEVOTHYROXINE SODIUM 0.2 MG/1
200 TABLET ORAL DAILY
Qty: 90 TABLET | Refills: 0 | Status: SHIPPED | OUTPATIENT
Start: 2021-12-30 | End: 2022-05-23

## 2021-12-30 RX ORDER — FLUOXETINE HYDROCHLORIDE 60 MG/1
60 TABLET, FILM COATED ORAL; ORAL DAILY
Qty: 90 TABLET | Refills: 2 | Status: SHIPPED | OUTPATIENT
Start: 2021-12-30 | End: 2022-05-31

## 2021-12-30 RX ORDER — LEVOTHYROXINE SODIUM 0.03 MG/1
TABLET ORAL
Qty: 45 TABLET | Refills: 0 | Status: SHIPPED | OUTPATIENT
Start: 2021-12-30 | End: 2022-03-28

## 2022-01-18 DIAGNOSIS — G47.9 DIFFICULTY SLEEPING: ICD-10-CM

## 2022-01-18 RX ORDER — TRAZODONE HYDROCHLORIDE 50 MG/1
50 TABLET ORAL NIGHTLY PRN
Qty: 30 TABLET | Refills: 0 | Status: CANCELLED | OUTPATIENT
Start: 2022-01-18

## 2022-01-18 NOTE — TELEPHONE ENCOUNTER
----- Message from April Nagle sent at 1/18/2022  9:26 AM EST -----  Subject: Refill Request    QUESTIONS  Name of Medication? traZODone (DESYREL) 50 MG tablet  Patient-reported dosage and instructions? 2 50mg tablets once nightly   How many days do you have left? 0  Preferred Pharmacy? [facility] #[id number]  Pharmacy phone number (if available)? [phone]  Additional Information for Provider? Patient states Anand Marquez switched   dosage up to 100mg a day and would like a 30 day supply of this medication   if possible. Every time she calls in she gets a 2 week supply. please call   back to advise. thank you   ---------------------------------------------------------------------------  --------------  CALL BACK INFO  What is the best way for the office to contact you? OK to leave message on   voicemail  Preferred Call Back Phone Number?  [phone]

## 2022-01-18 NOTE — TELEPHONE ENCOUNTER
Medication:   Requested Prescriptions     Pending Prescriptions Disp Refills    traZODone (DESYREL) 50 MG tablet 30 tablet 0     Sig: Take 1 tablet by mouth nightly as needed for Sleep        Last Filled:  12/30/2021    Patient Phone Number: [phone] (home)     Last appt: 11/11/2021   Next appt: Visit date not found    Last OARRS: No flowsheet data found.

## 2022-01-24 DIAGNOSIS — G47.9 DIFFICULTY SLEEPING: ICD-10-CM

## 2022-01-24 RX ORDER — TRAZODONE HYDROCHLORIDE 100 MG/1
100 TABLET ORAL NIGHTLY PRN
Qty: 30 TABLET | Refills: 2 | Status: SHIPPED | OUTPATIENT
Start: 2022-01-24 | End: 2022-05-02

## 2022-01-24 NOTE — TELEPHONE ENCOUNTER
----- Message from Abby Neth sent at 1/24/2022  8:28 AM EST -----  Subject: Message to Provider    QUESTIONS  Information for Provider? Please call patient to follow up regarding   medication refill for trazodone (DESYREL). She sent a medication refill   over on 1/18/22. She has not heard anything and also when she does get the   medication called in it is the wrong mg. Please call patient back to   advise.   ---------------------------------------------------------------------------  --------------  CALL BACK INFO  What is the best way for the office to contact you? OK to leave message on   voicemail  Preferred Call Back Phone Number? [phone]  ---------------------------------------------------------------------------  --------------  SCRIPT ANSWERS  Relationship to Patient?  Self

## 2022-01-24 NOTE — TELEPHONE ENCOUNTER
Pt was prescribed 50 mg and was told she could go up to 100 mg. Pt stated she has been on 100 mg for 6 months now and need the new prescription sent over asap.      Please advise pt

## 2022-02-21 RX ORDER — NIFEDIPINE 60 MG/1
60 TABLET, EXTENDED RELEASE ORAL DAILY
Qty: 90 TABLET | Refills: 0 | Status: SHIPPED | OUTPATIENT
Start: 2022-02-21 | End: 2022-05-23

## 2022-02-21 NOTE — TELEPHONE ENCOUNTER
Medication:   Requested Prescriptions     Pending Prescriptions Disp Refills    NIFEdipine (PROCARDIA XL) 60 MG extended release tablet [Pharmacy Med Name: NIFEDIPINE 60MG ER (XL/OS) TABLETS] 90 tablet 0     Sig: TAKE 1 TABLET BY MOUTH DAILY        Last Filled:      Patient Phone Number: [phone] (home)     Last appt: 11/11/2021   Next appt: Visit date not found    Last OARRS: No flowsheet data found.

## 2022-03-09 ENCOUNTER — OFFICE VISIT (OUTPATIENT)
Dept: FAMILY MEDICINE CLINIC | Age: 32
End: 2022-03-09

## 2022-03-09 ENCOUNTER — TELEPHONE (OUTPATIENT)
Dept: FAMILY MEDICINE CLINIC | Age: 32
End: 2022-03-09

## 2022-03-09 VITALS
DIASTOLIC BLOOD PRESSURE: 85 MMHG | SYSTOLIC BLOOD PRESSURE: 125 MMHG | WEIGHT: 293 LBS | HEIGHT: 64 IN | HEART RATE: 83 BPM | OXYGEN SATURATION: 97 % | TEMPERATURE: 96.3 F | BODY MASS INDEX: 50.02 KG/M2

## 2022-03-09 DIAGNOSIS — Z71.9 HEALTH EDUCATION: ICD-10-CM

## 2022-03-09 DIAGNOSIS — E03.9 HYPOTHYROIDISM, UNSPECIFIED TYPE: ICD-10-CM

## 2022-03-09 DIAGNOSIS — J01.90 ACUTE BACTERIAL SINUSITIS: ICD-10-CM

## 2022-03-09 DIAGNOSIS — R05.9 COUGH: Primary | ICD-10-CM

## 2022-03-09 DIAGNOSIS — R63.5 WEIGHT GAIN: ICD-10-CM

## 2022-03-09 DIAGNOSIS — B96.89 ACUTE BACTERIAL SINUSITIS: ICD-10-CM

## 2022-03-09 LAB
A/G RATIO: 1.6 (ref 1.1–2.2)
ALBUMIN SERPL-MCNC: 4.5 G/DL (ref 3.4–5)
ALP BLD-CCNC: 64 U/L (ref 40–129)
ALT SERPL-CCNC: 33 U/L (ref 10–40)
ANION GAP SERPL CALCULATED.3IONS-SCNC: 15 MMOL/L (ref 3–16)
AST SERPL-CCNC: 23 U/L (ref 15–37)
BILIRUB SERPL-MCNC: 0.3 MG/DL (ref 0–1)
BUN BLDV-MCNC: 8 MG/DL (ref 7–20)
CALCIUM SERPL-MCNC: 9.6 MG/DL (ref 8.3–10.6)
CHLORIDE BLD-SCNC: 104 MMOL/L (ref 99–110)
CO2: 24 MMOL/L (ref 21–32)
CREAT SERPL-MCNC: 0.7 MG/DL (ref 0.6–1.1)
GFR AFRICAN AMERICAN: >60
GFR NON-AFRICAN AMERICAN: >60
GLUCOSE BLD-MCNC: 90 MG/DL (ref 70–99)
POTASSIUM SERPL-SCNC: 4.1 MMOL/L (ref 3.5–5.1)
SODIUM BLD-SCNC: 143 MMOL/L (ref 136–145)
T4 FREE: 1.6 NG/DL (ref 0.9–1.8)
TOTAL PROTEIN: 7.3 G/DL (ref 6.4–8.2)
TSH SERPL DL<=0.05 MIU/L-ACNC: 0.93 UIU/ML (ref 0.27–4.2)

## 2022-03-09 PROCEDURE — 99214 OFFICE O/P EST MOD 30 MIN: CPT | Performed by: NURSE PRACTITIONER

## 2022-03-09 RX ORDER — CEFDINIR 300 MG/1
300 CAPSULE ORAL 2 TIMES DAILY
Qty: 14 CAPSULE | Refills: 0 | Status: SHIPPED | OUTPATIENT
Start: 2022-03-09 | End: 2022-03-16

## 2022-03-09 SDOH — ECONOMIC STABILITY: FOOD INSECURITY: WITHIN THE PAST 12 MONTHS, YOU WORRIED THAT YOUR FOOD WOULD RUN OUT BEFORE YOU GOT MONEY TO BUY MORE.: NEVER TRUE

## 2022-03-09 SDOH — ECONOMIC STABILITY: FOOD INSECURITY: WITHIN THE PAST 12 MONTHS, THE FOOD YOU BOUGHT JUST DIDN'T LAST AND YOU DIDN'T HAVE MONEY TO GET MORE.: NEVER TRUE

## 2022-03-09 ASSESSMENT — ENCOUNTER SYMPTOMS
SHORTNESS OF BREATH: 0
BACK PAIN: 0
SINUS PRESSURE: 1
DIARRHEA: 0
SINUS PAIN: 1
NAUSEA: 0
EYE REDNESS: 0
EYE ITCHING: 0
EYE DISCHARGE: 0
PHOTOPHOBIA: 0
TROUBLE SWALLOWING: 0
EYE PAIN: 0
CONSTIPATION: 0
SORE THROAT: 0
RHINORRHEA: 1
CHOKING: 0
COLOR CHANGE: 0
WHEEZING: 0
ABDOMINAL PAIN: 0
VOMITING: 0
CHEST TIGHTNESS: 0
VOICE CHANGE: 0
STRIDOR: 0
COUGH: 1
BLOOD IN STOOL: 0

## 2022-03-09 ASSESSMENT — ANXIETY QUESTIONNAIRES
6. BECOMING EASILY ANNOYED OR IRRITABLE: 0
7. FEELING AFRAID AS IF SOMETHING AWFUL MIGHT HAPPEN: 0
4. TROUBLE RELAXING: 0
1. FEELING NERVOUS, ANXIOUS, OR ON EDGE: 0
2. NOT BEING ABLE TO STOP OR CONTROL WORRYING: 0
3. WORRYING TOO MUCH ABOUT DIFFERENT THINGS: 0
IF YOU CHECKED OFF ANY PROBLEMS ON THIS QUESTIONNAIRE, HOW DIFFICULT HAVE THESE PROBLEMS MADE IT FOR YOU TO DO YOUR WORK, TAKE CARE OF THINGS AT HOME, OR GET ALONG WITH OTHER PEOPLE: NOT DIFFICULT AT ALL
GAD7 TOTAL SCORE: 0
5. BEING SO RESTLESS THAT IT IS HARD TO SIT STILL: 0

## 2022-03-09 ASSESSMENT — PATIENT HEALTH QUESTIONNAIRE - PHQ9
4. FEELING TIRED OR HAVING LITTLE ENERGY: 2
SUM OF ALL RESPONSES TO PHQ QUESTIONS 1-9: 5
7. TROUBLE CONCENTRATING ON THINGS, SUCH AS READING THE NEWSPAPER OR WATCHING TELEVISION: 0
5. POOR APPETITE OR OVEREATING: 3
SUM OF ALL RESPONSES TO PHQ QUESTIONS 1-9: 5
SUM OF ALL RESPONSES TO PHQ9 QUESTIONS 1 & 2: 0
8. MOVING OR SPEAKING SO SLOWLY THAT OTHER PEOPLE COULD HAVE NOTICED. OR THE OPPOSITE, BEING SO FIGETY OR RESTLESS THAT YOU HAVE BEEN MOVING AROUND A LOT MORE THAN USUAL: 0
9. THOUGHTS THAT YOU WOULD BE BETTER OFF DEAD, OR OF HURTING YOURSELF: 0
10. IF YOU CHECKED OFF ANY PROBLEMS, HOW DIFFICULT HAVE THESE PROBLEMS MADE IT FOR YOU TO DO YOUR WORK, TAKE CARE OF THINGS AT HOME, OR GET ALONG WITH OTHER PEOPLE: 0
6. FEELING BAD ABOUT YOURSELF - OR THAT YOU ARE A FAILURE OR HAVE LET YOURSELF OR YOUR FAMILY DOWN: 0
SUM OF ALL RESPONSES TO PHQ QUESTIONS 1-9: 5
2. FEELING DOWN, DEPRESSED OR HOPELESS: 0
3. TROUBLE FALLING OR STAYING ASLEEP: 0
SUM OF ALL RESPONSES TO PHQ QUESTIONS 1-9: 5
1. LITTLE INTEREST OR PLEASURE IN DOING THINGS: 0

## 2022-03-09 ASSESSMENT — SOCIAL DETERMINANTS OF HEALTH (SDOH): HOW HARD IS IT FOR YOU TO PAY FOR THE VERY BASICS LIKE FOOD, HOUSING, MEDICAL CARE, AND HEATING?: NOT VERY HARD

## 2022-03-09 NOTE — PROGRESS NOTES
Chief Complaint   Patient presents with    Cough    Nasal Congestion    Congestion       /85 (Site: Right Upper Arm, Position: Sitting, Cuff Size: Medium Adult)   Pulse 83   Temp 96.3 °F (35.7 °C) (Temporal)   Ht 5' 4\" (1.626 m)   Wt (!) 360 lb (163.3 kg)   SpO2 97%   BMI 61.79 kg/m²     HPI:  Phu Arvizu is a 36 y.o. (: 1990) here today   for   HPI    Patient's medications, allergies, past medical, surgical, social and family histories were reviewed and updated as appropriate. Acute:    Cough/Cold: Started 3 weeks ago. She had COVID the beginning of year, cough never went away, green and yellow mucus up, Post nasal drip, sneeze, runny nose, daughter has it as well. Denies fever and body aches. Denies diarrhea and body aches. She has tried cough syrup. She has had pneumonia in past. She is not wheezing. Chronic:    Hypothyroidism: She has had it checked in 3 months but states has not been checked in 6 months. She will need routine follow up with PCP to discuss more in depth , will order labs today. BMI 61.79: Significant recent weight gain. She was started on Depo by Gyn in November and has gained weight since then. We discussed good eating habits and getting physical activity. Will consider A1C if glucose high. Discussed prediabetes vs diabetes. Educated on disease process and that we want to prevent this. Discussed seeing nutritionist.     She drinks sprite and sweet tea, will try adding in/replacing with water every other drink      ROS:  Review of Systems   Constitutional: Positive for activity change. Negative for appetite change, chills, diaphoresis, fatigue, fever and unexpected weight change. HENT: Positive for congestion, postnasal drip, rhinorrhea, sinus pressure and sinus pain. Negative for ear discharge, ear pain, hearing loss, nosebleeds, sneezing, sore throat, tinnitus, trouble swallowing and voice change.     Eyes: Negative for photophobia, pain, discharge, redness and itching. Respiratory: Positive for cough. Negative for choking, chest tightness, shortness of breath, wheezing and stridor. Cardiovascular: Negative for chest pain, palpitations and leg swelling. Gastrointestinal: Negative for abdominal pain, blood in stool, constipation, diarrhea, nausea and vomiting. Endocrine: Negative for cold intolerance, heat intolerance, polydipsia and polyuria. Genitourinary: Negative for difficulty urinating, dysuria, enuresis, flank pain, frequency, hematuria and urgency. Musculoskeletal: Negative for back pain, gait problem, joint swelling, neck pain and neck stiffness. Skin: Negative for color change, pallor, rash and wound. Allergic/Immunologic: Negative for environmental allergies and food allergies. Neurological: Negative for dizziness, tremors, syncope, speech difficulty, weakness, light-headedness, numbness and headaches. Hematological: Negative for adenopathy. Does not bruise/bleed easily. Psychiatric/Behavioral: Negative for agitation, behavioral problems, confusion, decreased concentration, dysphoric mood, hallucinations, self-injury, sleep disturbance and suicidal ideas. The patient is not nervous/anxious and is not hyperactive. Prior to Visit Medications    Medication Sig Taking?  Authorizing Provider   cefdinir (OMNICEF) 300 MG capsule Take 1 capsule by mouth 2 times daily for 7 days Yes SYLVIA Hedrick CNP   NIFEdipine (PROCARDIA XL) 60 MG extended release tablet TAKE 1 TABLET BY MOUTH DAILY Yes SYLVIA Avendaño CNP   traZODone (DESYREL) 100 MG tablet Take 1 tablet by mouth nightly as needed for Sleep Yes SYLVIA Avendaño CNP   FLUoxetine HCl 60 MG TABS Take 60 mg by mouth daily Yes SYLVIA Avendaño CNP   levothyroxine (SYNTHROID) 200 MCG tablet Take 1 tablet by mouth Daily Yes SYLVIA Avendaño CNP   levothyroxine (SYNTHROID) 25 MCG tablet TAKE ONE-HALF TABLET BY MOUTH DAILY. TAKE WITH 200MCG TABLET Yes Huey Maldonado APRN - CNP   dicyclomine (BENTYL) 10 MG capsule TAKE 1 CAPSULE BY MOUTH FOUR TIMES DAILY WITH MEALS AND EVERY NIGHT AT BEDTIME Yes Historical Provider, MD   Lansoprazole (PREVACID PO) Take by mouth Yes Historical Provider, MD   Famotidine (PEPCID PO) Take by mouth Yes Historical Provider, MD   Ascorbic Acid (VITAMIN C PO) Take by mouth Yes Historical Provider, MD   Multiple Vitamins-Minerals (THERAPEUTIC MULTIVITAMIN-MINERALS) tablet Take 1 tablet by mouth daily Yes Historical Provider, MD       No Known Allergies    OBJECTIVE:      BP Readings from Last 2 Encounters:   03/09/22 125/85   11/11/21 130/80       Wt Readings from Last 3 Encounters:   03/09/22 (!) 360 lb (163.3 kg)   11/11/21 (!) 350 lb (158.8 kg)   04/29/21 (!) 345 lb (156.5 kg)     Physical Exam  Vitals reviewed. Constitutional:       General: She is not in acute distress. Appearance: Normal appearance. She is well-developed. She is morbidly obese. HENT:      Head: Normocephalic and atraumatic. Right Ear: Hearing, ear canal and external ear normal. Tympanic membrane is scarred. Left Ear: Hearing, tympanic membrane, ear canal and external ear normal.      Nose: Rhinorrhea present. Right Sinus: No maxillary sinus tenderness or frontal sinus tenderness. Left Sinus: No maxillary sinus tenderness or frontal sinus tenderness. Mouth/Throat:      Pharynx: No oropharyngeal exudate. Eyes:      General:         Right eye: No discharge. Left eye: No discharge. Conjunctiva/sclera: Conjunctivae normal.   Neck:      Thyroid: No thyromegaly. Vascular: No JVD. Trachea: No tracheal deviation. Cardiovascular:      Rate and Rhythm: Normal rate and regular rhythm. Heart sounds: Normal heart sounds. No murmur heard. No friction rub. Pulmonary:      Effort: Pulmonary effort is normal. No respiratory distress.       Breath sounds: Normal breath sounds. No stridor. No decreased breath sounds, wheezing, rhonchi or rales. Musculoskeletal:         General: No tenderness. Normal range of motion. Cervical back: Normal range of motion. Lymphadenopathy:      Cervical: No cervical adenopathy. Skin:     General: Skin is warm and dry. Capillary Refill: Capillary refill takes less than 2 seconds. Findings: No rash. Neurological:      Mental Status: She is alert and oriented to person, place, and time. Sensory: Sensation is intact. Motor: Motor function is intact. Coordination: Coordination normal.   Psychiatric:         Attention and Perception: Attention and perception normal.         Mood and Affect: Mood normal.         Speech: Speech normal.         Behavior: Behavior normal. Behavior is cooperative. Thought Content: Thought content normal.         Cognition and Memory: Cognition normal.         Judgment: Judgment normal.       ASSESSMENT/PLAN:    1. Cough    - cefdinir (OMNICEF) 300 MG capsule; Take 1 capsule by mouth 2 times daily for 7 days  Dispense: 14 capsule; Refill: 0    2. Acute bacterial sinusitis    - cefdinir (OMNICEF) 300 MG capsule; Take 1 capsule by mouth 2 times daily for 7 days  Dispense: 14 capsule; Refill: 0    3. Hypothyroidism, unspecified type    - TSH; Future  - T4, Free; Future  - Comprehensive Metabolic Panel; Future    4. Weight gain    - Significant weight gain. - Discussed starting healthier      5. BMI 60.0-69.9, adult (HCC)    - TSH; Future  - T4, Free; Future  - Comprehensive Metabolic Panel; Future    6. Health education    - TSH; Future  - T4, Free; Future  - Comprehensive Metabolic Panel; Future    I will follow up with labs and may consider adding on A1C based on glucose. Discussed possible diabetic diagnosis. Educated on diet and exercise. Follow up if symptoms do not improve or worsen. If the patient becomes short of breath go straight to the ER or call 911.

## 2022-03-09 NOTE — TELEPHONE ENCOUNTER
----- Message from Bang Uribe sent at 3/9/2022  8:44 AM EST -----  Subject: Message to Provider    QUESTIONS  Information for Provider? Patients thyroid needs to be checked. Please   contact patient.  ---------------------------------------------------------------------------  --------------  CALL BACK INFO  What is the best way for the office to contact you? OK to leave message on   voicemail  Preferred Call Back Phone Number? [phone]  ---------------------------------------------------------------------------  --------------  SCRIPT ANSWERS  Relationship to Patient?  Self

## 2022-03-28 DIAGNOSIS — E03.9 HYPOTHYROIDISM, UNSPECIFIED TYPE: ICD-10-CM

## 2022-03-28 RX ORDER — LEVOTHYROXINE SODIUM 0.03 MG/1
TABLET ORAL
Qty: 45 TABLET | Refills: 1 | Status: SHIPPED | OUTPATIENT
Start: 2022-03-28 | End: 2022-05-31 | Stop reason: SDUPTHER

## 2022-03-28 NOTE — TELEPHONE ENCOUNTER
Medication:   Requested Prescriptions     Pending Prescriptions Disp Refills    levothyroxine (SYNTHROID) 25 MCG tablet [Pharmacy Med Name: LEVOTHYROXINE 0.025MG (25MCG) TAB] 45 tablet 0     Sig: TAKE 1/2 TABLET BY MOUTH DAILY; TAKE WITH 200MCG TABLET       Last Filled:      Patient Phone Number: [phone] (home)     Last appt: 3/9/2022   Next appt: Visit date not found    Last Thyroid: 3/9/22  Lab Results   Component Value Date    TSH 0.93 03/09/2022    T5UWFXO 1.21 12/14/2020    T4FREE 1.6 03/09/2022

## 2022-04-28 DIAGNOSIS — G47.9 DIFFICULTY SLEEPING: ICD-10-CM

## 2022-04-29 NOTE — TELEPHONE ENCOUNTER
Medication:   Requested Prescriptions     Pending Prescriptions Disp Refills    traZODone (DESYREL) 100 MG tablet [Pharmacy Med Name: TRAZODONE 100MG TABLETS] 30 tablet 2     Sig: TAKE 1 TABLET BY MOUTH EVERY NIGHT AS NEEDED FOR SLEEP        Last Filled:  01/24/2022    Patient Phone Number: [phone] (home)     Last appt: 3/9/2022   Next appt: Visit date not found    Last OARRS: No flowsheet data found.

## 2022-05-02 RX ORDER — TRAZODONE HYDROCHLORIDE 100 MG/1
TABLET ORAL
Qty: 30 TABLET | Refills: 2 | Status: SHIPPED | OUTPATIENT
Start: 2022-05-02 | End: 2022-09-02

## 2022-05-18 ENCOUNTER — NURSE TRIAGE (OUTPATIENT)
Dept: OTHER | Facility: CLINIC | Age: 32
End: 2022-05-18

## 2022-05-18 ENCOUNTER — OFFICE VISIT (OUTPATIENT)
Dept: FAMILY MEDICINE CLINIC | Age: 32
End: 2022-05-18

## 2022-05-18 VITALS
DIASTOLIC BLOOD PRESSURE: 85 MMHG | OXYGEN SATURATION: 97 % | TEMPERATURE: 97.3 F | WEIGHT: 293 LBS | BODY MASS INDEX: 50.02 KG/M2 | HEART RATE: 100 BPM | HEIGHT: 64 IN | SYSTOLIC BLOOD PRESSURE: 130 MMHG

## 2022-05-18 DIAGNOSIS — M25.472 BILATERAL SWELLING OF FEET AND ANKLES: Primary | ICD-10-CM

## 2022-05-18 DIAGNOSIS — R05.9 COUGH: ICD-10-CM

## 2022-05-18 DIAGNOSIS — M25.474 BILATERAL SWELLING OF FEET AND ANKLES: Primary | ICD-10-CM

## 2022-05-18 DIAGNOSIS — M25.471 BILATERAL SWELLING OF FEET AND ANKLES: Primary | ICD-10-CM

## 2022-05-18 DIAGNOSIS — M25.475 BILATERAL SWELLING OF FEET AND ANKLES: Primary | ICD-10-CM

## 2022-05-18 PROCEDURE — 99213 OFFICE O/P EST LOW 20 MIN: CPT | Performed by: NURSE PRACTITIONER

## 2022-05-18 ASSESSMENT — ENCOUNTER SYMPTOMS
DIARRHEA: 0
SINUS PRESSURE: 0
STRIDOR: 0
BLOOD IN STOOL: 0
TROUBLE SWALLOWING: 0
SHORTNESS OF BREATH: 0
WHEEZING: 0
SINUS PAIN: 0
CHOKING: 0
EYE DISCHARGE: 0
RHINORRHEA: 0
EYE REDNESS: 0
EYE ITCHING: 0
COUGH: 1
COLOR CHANGE: 0
ABDOMINAL PAIN: 0
NAUSEA: 0
BACK PAIN: 0
VOMITING: 0
VOICE CHANGE: 0
CONSTIPATION: 0
SORE THROAT: 0
CHEST TIGHTNESS: 0
EYE PAIN: 0
PHOTOPHOBIA: 0

## 2022-05-18 NOTE — PROGRESS NOTES
vomiting. Endocrine: Negative for cold intolerance, heat intolerance, polydipsia and polyuria. Genitourinary: Negative for difficulty urinating, dysuria, enuresis, flank pain, frequency, hematuria and urgency. Musculoskeletal: Negative for back pain, gait problem, joint swelling (ankels and feet. ), neck pain and neck stiffness. Skin: Negative for color change, pallor, rash and wound. Allergic/Immunologic: Negative for environmental allergies and food allergies. Neurological: Negative for dizziness, tremors, syncope, speech difficulty, weakness, light-headedness, numbness and headaches. Hematological: Negative for adenopathy. Does not bruise/bleed easily. Psychiatric/Behavioral: Negative for agitation, behavioral problems, confusion, decreased concentration, dysphoric mood, hallucinations, self-injury, sleep disturbance and suicidal ideas. The patient is not nervous/anxious and is not hyperactive. Prior to Visit Medications    Medication Sig Taking?  Authorizing Provider   traZODone (DESYREL) 100 MG tablet TAKE 1 TABLET BY MOUTH EVERY NIGHT AS NEEDED FOR SLEEP  May Lexie, APRN - CNP   levothyroxine (SYNTHROID) 25 MCG tablet TAKE 1/2 TABLET BY MOUTH DAILY; TAKE WITH 200MCG TABLET  May Lexie, APRN - CNP   NIFEdipine (PROCARDIA XL) 60 MG extended release tablet TAKE 1 TABLET BY MOUTH DAILY  May Lexie, APRN - CNP   FLUoxetine HCl 60 MG TABS Take 60 mg by mouth daily  May Lexie, APRN - CNP   levothyroxine (SYNTHROID) 200 MCG tablet Take 1 tablet by mouth Daily  May Lexie, APRN - CNP   dicyclomine (BENTYL) 10 MG capsule TAKE 1 CAPSULE BY MOUTH FOUR TIMES DAILY WITH MEALS AND EVERY NIGHT AT BEDTIME  Historical Provider, MD   Lansoprazole (PREVACID PO) Take by mouth  Historical Provider, MD   Famotidine (PEPCID PO) Take by mouth  Historical Provider, MD   Ascorbic Acid (VITAMIN C PO) Take by mouth  Historical Provider, MD   Multiple Vitamins-Minerals (THERAPEUTIC MULTIVITAMIN-MINERALS) tablet Take 1 tablet by mouth daily  Historical Provider, MD       No Known Allergies    OBJECTIVE:      BP Readings from Last 2 Encounters:   22 130/85   22 125/85       Wt Readings from Last 3 Encounters:   22 (!) 364 lb (165.1 kg)   22 (!) 360 lb (163.3 kg)   21 (!) 350 lb (158.8 kg)       Physical Exam  Constitutional:       Appearance: She is obese. HENT:      Right Ear: External ear normal.      Left Ear: External ear normal.   Eyes:      Conjunctiva/sclera: Conjunctivae normal.   Cardiovascular:      Rate and Rhythm: Normal rate and regular rhythm. Heart sounds: Normal heart sounds. No murmur heard. No friction rub. No gallop. Pulmonary:      Effort: Pulmonary effort is normal. No respiratory distress. Breath sounds: Normal breath sounds. No stridor. No wheezing, rhonchi or rales. Chest:      Chest wall: No tenderness. Musculoskeletal:         General: Normal range of motion. Right lower le+ Edema present. Left lower le+ Edema present. Feet:      Right foot:      Skin integrity: Skin integrity normal.      Toenail Condition: Right toenails are normal.      Left foot:      Skin integrity: Skin integrity normal.      Toenail Condition: Left toenails are normal.   Skin:     General: Skin is warm and dry. Capillary Refill: Capillary refill takes less than 2 seconds. Neurological:      General: No focal deficit present. Mental Status: She is alert and oriented to person, place, and time. Psychiatric:         Mood and Affect: Mood normal.         Behavior: Behavior normal.         Thought Content: Thought content normal.         Judgment: Judgment normal.       ASSESSMENT/PLAN:    1. Bilateral swelling of feet and ankles    - Echocardiogram complete; Future  - [facility]    2. Cough    - Echocardiogram complete;  Future  - will consider post [facility] clinic if needed, lungs clear on exam, no cough during visit no SOB. 3. BMI 60.0-69.9, adult ([facility])    - Echocardiogram complete; Future  - [facility]    If echo negative will try diuretic. Patient will continue try to work on diet and exercise. Will refer to [facility] as well. We will follow-up with echo results. Discussed for her chronic cough may be following up with the post-COVID clinic. We will see what echo shows and pending diuretic use may improve cough. Lungs clear on exam.  Not short of breath in office. Follow up if symptoms do not improve or worsen. If the patient becomes short of breath go straight to the ER or call 911.

## 2022-05-18 NOTE — TELEPHONE ENCOUNTER
Received call from Zenaida at [facility] with [employer] Complaint. Current Symptoms: bilateral feet swelling extends above ankles, left greater than right    No swelling in the morning, swelling increases throughout the day    Onset: \"It has always happened but it has gotten worse over the past month. Denies - chest pain / shortness of breath / discoloration / red streaking / pain        Pain Severity: 0/10; N/A; none    Temperature: Denies      What has been tried: Elevation      Recommended disposition: See PCP within 3 Days    Care advice provided, patient verbalizes understanding; denies any other questions or concerns; instructed to call back for any new or worsening symptoms. Patient/Caller agrees with recommended disposition; writer provided warm transfer to [employer] at [facility] for appointment scheduling     Attention Provider: Thank you for allowing me to participate in the care of your patient. The patient was connected to triage in response to information provided to the ECC/PSC. Please do not respond through this encounter as the response is not directed to a shared pool.           Reason for Disposition   MILD swelling of both ankles (i.e., pedal edema) AND new-onset or worsening    Protocols used: LEG SWELLING AND EDEMA-ADULT-OH

## 2022-05-23 RX ORDER — NIFEDIPINE 60 MG/1
60 TABLET, EXTENDED RELEASE ORAL DAILY
Qty: 90 TABLET | Refills: 0 | Status: SHIPPED | OUTPATIENT
Start: 2022-05-23 | End: 2022-08-19

## 2022-05-23 RX ORDER — LEVOTHYROXINE SODIUM 0.2 MG/1
200 TABLET ORAL DAILY
Qty: 90 TABLET | Refills: 0 | Status: SHIPPED | OUTPATIENT
Start: 2022-05-23 | End: 2022-10-05 | Stop reason: SDUPTHER

## 2022-05-23 NOTE — TELEPHONE ENCOUNTER
Medication:   Requested Prescriptions     Pending Prescriptions Disp Refills    NIFEdipine (PROCARDIA XL) 60 MG extended release tablet [Pharmacy Med Name: NIFEDIPINE 60MG ER (XL/OS) TABLETS] 90 tablet 0     Sig: TAKE 1 TABLET BY MOUTH DAILY    levothyroxine (SYNTHROID) 200 MCG tablet [Pharmacy Med Name: LEVOTHYROXINE 0.2MG (200MCG) TAB] 90 tablet 0     Sig: TAKE 1 TABLET BY MOUTH DAILY       Last Filled:      Patient Phone Number: [phone] (home)     Last appt: 5/18/2022   Next appt: Visit date not found    Last Thyroid: 3/9/22  Lab Results   Component Value Date    TSH 0.93 03/09/2022    A8MEXJI 1.21 12/14/2020    T4FREE 1.6 03/09/2022

## 2022-05-26 ENCOUNTER — HOSPITAL ENCOUNTER (OUTPATIENT)
Dept: CARDIOLOGY | Age: 32
Discharge: HOME | End: 2022-05-26

## 2022-05-26 DIAGNOSIS — M25.472 BILATERAL SWELLING OF FEET AND ANKLES: ICD-10-CM

## 2022-05-26 DIAGNOSIS — M25.471 BILATERAL SWELLING OF FEET AND ANKLES: ICD-10-CM

## 2022-05-26 DIAGNOSIS — M25.475 BILATERAL SWELLING OF FEET AND ANKLES: ICD-10-CM

## 2022-05-26 DIAGNOSIS — R05.9 COUGH: ICD-10-CM

## 2022-05-26 DIAGNOSIS — M25.474 BILATERAL SWELLING OF FEET AND ANKLES: ICD-10-CM

## 2022-05-26 LAB
LV EF: 55 %
LVEF MODALITY: NORMAL

## 2022-05-26 PROCEDURE — 93306 TTE W/DOPPLER COMPLETE: CPT

## 2022-05-27 ENCOUNTER — TELEPHONE (OUTPATIENT)
Dept: FAMILY MEDICINE CLINIC | Age: 32
End: 2022-05-27

## 2022-05-27 NOTE — TELEPHONE ENCOUNTER
Spoke with since message was sent to me and Kat Ramírez is out of the office    Swelling has improved bilaterally, no calf tenderness, no redness- discuss unlikely a blood clot, but if it these symptoms occur should be seen ASAP/ ER. Encouraged patient to get compression stockings for when lower legs do swell. Patient v/u.

## 2022-05-27 NOTE — TELEPHONE ENCOUNTER
Pt rc.  Echocardiogram results given to pt. Pt would still like to discuss further blood work to check for possible blood clots.     Pt states the cough is completely gone and now she feels like most of the swelling is in her left foot and ankle

## 2022-05-31 ENCOUNTER — OFFICE VISIT (OUTPATIENT)
Dept: FAMILY MEDICINE CLINIC | Age: 32
End: 2022-05-31

## 2022-05-31 ENCOUNTER — NURSE TRIAGE (OUTPATIENT)
Dept: OTHER | Facility: CLINIC | Age: 32
End: 2022-05-31

## 2022-05-31 VITALS
BODY MASS INDEX: 61.97 KG/M2 | DIASTOLIC BLOOD PRESSURE: 76 MMHG | HEART RATE: 93 BPM | SYSTOLIC BLOOD PRESSURE: 124 MMHG | OXYGEN SATURATION: 95 % | RESPIRATION RATE: 20 BRPM | WEIGHT: 293 LBS | TEMPERATURE: 97 F

## 2022-05-31 DIAGNOSIS — R00.0 TACHYCARDIA: Primary | ICD-10-CM

## 2022-05-31 DIAGNOSIS — R00.0 TACHYCARDIA: ICD-10-CM

## 2022-05-31 DIAGNOSIS — R10.13 EPIGASTRIC ABDOMINAL PAIN: ICD-10-CM

## 2022-05-31 DIAGNOSIS — R06.83 SNORING: ICD-10-CM

## 2022-05-31 DIAGNOSIS — R63.4 WEIGHT LOSS: ICD-10-CM

## 2022-05-31 DIAGNOSIS — E03.9 HYPOTHYROIDISM, UNSPECIFIED TYPE: ICD-10-CM

## 2022-05-31 LAB
D DIMER: <0.27 UG/ML FEU (ref 0–0.6)
HCT VFR BLD CALC: 40.5 % (ref 36–48)
HEMOGLOBIN: 13.6 G/DL (ref 12–16)
MCH RBC QN AUTO: 28.8 PG (ref 26–34)
MCHC RBC AUTO-ENTMCNC: 33.6 G/DL (ref 31–36)
MCV RBC AUTO: 85.8 FL (ref 80–100)
PDW BLD-RTO: 13.8 % (ref 12.4–15.4)
PLATELET # BLD: 205 K/UL (ref 135–450)
PMV BLD AUTO: 9.5 FL (ref 5–10.5)
RBC # BLD: 4.72 M/UL (ref 4–5.2)
WBC # BLD: 6.3 K/UL (ref 4–11)

## 2022-05-31 PROCEDURE — 93000 ELECTROCARDIOGRAM COMPLETE: CPT | Performed by: NURSE PRACTITIONER

## 2022-05-31 PROCEDURE — 99212 OFFICE O/P EST SF 10 MIN: CPT | Performed by: NURSE PRACTITIONER

## 2022-05-31 RX ORDER — OMEPRAZOLE 20 MG/1
20 CAPSULE, DELAYED RELEASE ORAL DAILY
COMMUNITY

## 2022-05-31 RX ORDER — LEVOTHYROXINE SODIUM 0.03 MG/1
TABLET ORAL
Qty: 45 TABLET | Refills: 1 | Status: SHIPPED | OUTPATIENT
Start: 2022-05-31

## 2022-05-31 NOTE — PROGRESS NOTES
Soumya Miller (:  ) is a 36 y.o. female,Established patient, here for evaluation of the following chief complaint(s):  Tachycardia (tightness in abdomen , feels SOB. seen 2 weeks ago for symptoms )       ASSESSMENT/PLAN:  1. Tachycardia  Stable;  EKG: sinus rhythm. D-dimer and CBC ordered. Discussed symptoms could be not having her current synthroid dose. Will restart and recheck in 6-8 weeks. -     EKG 12 Lead  -     D-Dimer, Quantitative; Future  -     CBC; Future  2. Hypothyroidism, unspecified type  Stable; Not progressing; Resent levothyroxine.  -     levothyroxine (SYNTHROID) 25 MCG tablet; TAKE 1/2 TABLET BY MOUTH DAILY; TAKE WITH 200MCG TABLET, Disp-45 tablet, R-1Normal  3. Epigastric abdominal pain  Stable; Not progressing;  Continue omeprazole and if persisting can consider increasing dose. 4. Weight loss  Stable;  Patient continues to lose. Does have an upcoming appointment with weight management. Continue dietary changes and increasing activity. 5. Snoring  Stable; Not progressing; Referral to sleep medicine. -     Elan Ayon MD, Sleep Medicine, [facility]      No follow-ups on file. Subjective   SUBJECTIVE/OBJECTIVE:  HPI  HR 95- sitting  Getting up- HR increases to 120  Stays in 110-120s  135 with activity  Can get a little bit of tightening in stomach- feels a little short of breath; can catch breath  No swelling  No chest pain  No cough  No chest tightening  No heart palpitations  Nausea- epigastric  Has not had 25 mg levothyroxine dose from pharmacy    Seeing dietician in July  Lost 15 lbs in 2 weeks- ? Water weight; eating pizza or dessert would gain 2 lbs    Has woken self up choking and snoring  No daytime sleepiness  Is concerned for DIEGO    Has stopped taking Prozac for anxiety  Feels like it is better without it    Review of Systems       Objective   Physical Exam  Vitals reviewed. Constitutional:       Appearance: Normal appearance. HENT:      Head: Normocephalic. Right Ear: External ear normal.      Left Ear: External ear normal.   Cardiovascular:      Rate and Rhythm: Normal rate and regular rhythm. Pulses: Normal pulses. Heart sounds: Normal heart sounds, S1 normal and S2 normal.   Pulmonary:      Effort: Pulmonary effort is normal.      Breath sounds: Normal breath sounds and air entry. Abdominal:      Tenderness: There is abdominal tenderness in the epigastric area. Musculoskeletal:      Right lower leg: No edema. Left lower leg: No edema. Neurological:      Mental Status: She is alert. Psychiatric:         Mood and Affect: Mood normal.       An electronic signature was used to authenticate this note.     --Melly Malhotra, APRN - CNP

## 2022-05-31 NOTE — TELEPHONE ENCOUNTER
Received call from [facility] at [facility] with [employer] Complaint. Subjective: Caller states \"feels tightness in stomach and gets mild shortness of breath with minimal exertion. Yesterday checked VS on pulse ox. SPO2 97%, HR  Has gotten up to 130s. Echo and EKG last week were normal. Dry cough and leg swelling is gone. Current Symptoms: see above    Onset: 1 month ago; unchanged    Associated Symptoms: NA    Pain Severity: denies pain    Temperature: denies fever    What has been tried:     LMP: NA Pregnant: No    Recommended disposition: Go to Office Now    Care advice provided, patient verbalizes understanding; denies any other questions or concerns; instructed to call back for any new or worsening symptoms. Patient/Caller agrees with recommended disposition; writer provided warm transfer to [employer] at [facility] for appointment scheduling     Attention Provider: Thank you for allowing me to participate in the care of your patient. The patient was connected to triage in response to information provided to the ECC/PSC. Please do not respond through this encounter as the response is not directed to a shared pool.       Reason for Disposition   MILD difficulty breathing (e.g., minimal/no SOB at rest, SOB with walking, pulse < 100) of new-onset or worse than normal    Protocols used: BREATHING DIFFICULTY-ADULT-OH

## 2022-06-08 ENCOUNTER — TELEPHONE (OUTPATIENT)
Dept: FAMILY MEDICINE CLINIC | Age: 32
End: 2022-06-08

## 2022-06-08 NOTE — TELEPHONE ENCOUNTER
----- Message from Son Merrill sent at 6/8/2022  1:38 PM EDT -----  Subject: Referral Request    QUESTIONS   Reason for referral request? Pt sees CNP Mergy & has been seen for   swelling in feet & a dry cough. discussed a sleep study but pt would like   to request an order for a chest xray prior to the sleep study. Has the physician seen you for this condition before? No   Preferred Specialist (if applicable)? Do you already have an appointment scheduled? No  Additional Information for Provider?   ---------------------------------------------------------------------------  --------------  CALL BACK INFO  What is the best way for the office to contact you? OK to leave message on   voicemail  Preferred Call Back Phone Number? [phone]  ---------------------------------------------------------------------------  --------------  SCRIPT ANSWERS  Relationship to Patient?  Self

## 2022-06-09 DIAGNOSIS — R05.9 COUGH: Primary | ICD-10-CM

## 2022-06-10 ENCOUNTER — HOSPITAL ENCOUNTER (OUTPATIENT)
Dept: GENERAL RADIOLOGY | Age: 32
Discharge: HOME | End: 2022-06-10

## 2022-06-10 DIAGNOSIS — R05.9 COUGH: ICD-10-CM

## 2022-06-10 PROCEDURE — 71046 X-RAY EXAM CHEST 2 VIEWS: CPT

## 2022-06-10 NOTE — TELEPHONE ENCOUNTER
Left detailed message with patient okay per HIPPA consent to let her know we sent in her chest xray order

## 2022-07-26 ENCOUNTER — OFFICE VISIT (OUTPATIENT)
Dept: FAMILY MEDICINE CLINIC | Age: 32
End: 2022-07-26

## 2022-07-26 VITALS
HEIGHT: 64 IN | OXYGEN SATURATION: 96 % | TEMPERATURE: 97.3 F | BODY MASS INDEX: 50.02 KG/M2 | SYSTOLIC BLOOD PRESSURE: 130 MMHG | HEART RATE: 93 BPM | WEIGHT: 293 LBS | DIASTOLIC BLOOD PRESSURE: 80 MMHG

## 2022-07-26 DIAGNOSIS — B00.9 HSV (HERPES SIMPLEX VIRUS) INFECTION: Primary | ICD-10-CM

## 2022-07-26 PROCEDURE — 99213 OFFICE O/P EST LOW 20 MIN: CPT | Performed by: NURSE PRACTITIONER

## 2022-07-26 RX ORDER — VALACYCLOVIR HYDROCHLORIDE 1 G/1
1000 TABLET, FILM COATED ORAL 2 TIMES DAILY
Qty: 20 TABLET | Refills: 0 | Status: SHIPPED | OUTPATIENT
Start: 2022-07-26 | End: 2022-08-05

## 2022-07-26 RX ORDER — FLUOXETINE HYDROCHLORIDE 60 MG/1
60 TABLET, FILM COATED ORAL; ORAL DAILY
COMMUNITY
Start: 2022-07-06

## 2022-07-26 ASSESSMENT — ENCOUNTER SYMPTOMS
BLOOD IN STOOL: 0
VOICE CHANGE: 0
COLOR CHANGE: 0
VOMITING: 0
WHEEZING: 0
NAUSEA: 0
SINUS PAIN: 0
SINUS PRESSURE: 0
CHEST TIGHTNESS: 0
EYE DISCHARGE: 0
EYE REDNESS: 0
SORE THROAT: 0
DIARRHEA: 0
STRIDOR: 0
RHINORRHEA: 0
PHOTOPHOBIA: 0
EYE ITCHING: 0
CONSTIPATION: 0
TROUBLE SWALLOWING: 0
SHORTNESS OF BREATH: 0
ABDOMINAL PAIN: 0
EYE PAIN: 0
BACK PAIN: 0
COUGH: 0
CHOKING: 0
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
## 2022-08-19 RX ORDER — NIFEDIPINE 60 MG/1
60 TABLET, EXTENDED RELEASE ORAL DAILY
Qty: 90 TABLET | Refills: 0 | Status: SHIPPED | OUTPATIENT
Start: 2022-08-19 | End: 2022-09-18

## 2022-08-19 NOTE — TELEPHONE ENCOUNTER
Medication:   Requested Prescriptions     Pending Prescriptions Disp Refills    NIFEdipine (PROCARDIA XL) 60 MG extended release tablet [Pharmacy Med Name: NIFEDIPINE 60MG ER (XL/OS) TABLETS] 90 tablet 0     Sig: TAKE 1 TABLET BY MOUTH DAILY        Last Filled:  05/23/2022    Patient Phone Number: [phone] (home)     Last appt: 7/26/2022   Next appt: Visit date not found    Last OARRS: No flowsheet data found.

## 2022-09-01 DIAGNOSIS — G47.9 DIFFICULTY SLEEPING: ICD-10-CM

## 2022-09-02 RX ORDER — TRAZODONE HYDROCHLORIDE 100 MG/1
TABLET ORAL
Qty: 30 TABLET | Refills: 2 | Status: SHIPPED | OUTPATIENT
Start: 2022-09-02

## 2022-09-02 NOTE — TELEPHONE ENCOUNTER
Medication:   Requested Prescriptions     Pending Prescriptions Disp Refills    traZODone (DESYREL) 100 MG tablet [Pharmacy Med Name: TRAZODONE 100MG TABLETS] 30 tablet 2     Sig: TAKE 1 TABLET BY MOUTH EVERY NIGHT AS NEEDED FOR SLEEP        Last Filled:  5/2/22 30 tablets 2 refills    Patient Phone Number: [phone] (home)     Last appt: 7/26/2022   Next appt: Visit date not found    Last OARRS: No flowsheet data found.

## 2022-10-05 RX ORDER — LEVOTHYROXINE SODIUM 0.2 MG/1
200 TABLET ORAL DAILY
Qty: 90 TABLET | Refills: 0 | Status: SHIPPED | OUTPATIENT
Start: 2022-10-05

## 2022-10-05 NOTE — TELEPHONE ENCOUNTER
----- Message from Holly Rosado sent at 10/4/2022  3:11 PM EDT -----  Subject: Refill Request    QUESTIONS  Name of Medication? levothyroxine (SYNTHROID) 200 MCG tablet  Patient-reported dosage and instructions? Once daily  How many days do you have left? 0  [id number]  Pharmacy phone number (if available)? [phone]  ---------------------------------------------------------------------------  --------------  Ashly Hopes INFO  What is the best way for the office to contact you? OK to leave message on   voicemail  Preferred Call Back Phone Number? [phone]  ---------------------------------------------------------------------------  --------------  SCRIPT ANSWERS  Relationship to Patient?  Self

## 2022-10-05 NOTE — TELEPHONE ENCOUNTER
Medication:   Requested Prescriptions     Pending Prescriptions Disp Refills    levothyroxine (SYNTHROID) 200 MCG tablet 90 tablet 0     Sig: Take 1 tablet by mouth Daily        Last Filled:  5/23/22    Patient Phone Number: [phone] (home)     Last appt: 7/26/2022   Next appt: Visit date not found    Last OARRS: No flowsheet data found.

## 2022-10-12 ENCOUNTER — TELEMEDICINE (OUTPATIENT)
Dept: PRIMARY CARE CLINIC | Age: 32
End: 2022-10-12

## 2022-10-12 DIAGNOSIS — J01.10 ACUTE NON-RECURRENT FRONTAL SINUSITIS: Primary | ICD-10-CM

## 2022-10-12 PROCEDURE — 99213 OFFICE O/P EST LOW 20 MIN: CPT | Performed by: NURSE PRACTITIONER

## 2022-10-12 RX ORDER — FLUTICASONE PROPIONATE 50 MCG
2 SPRAY, SUSPENSION (ML) NASAL DAILY
Qty: 16 G | Refills: 0 | Status: SHIPPED | OUTPATIENT
Start: 2022-10-12

## 2022-10-12 RX ORDER — AMOXICILLIN AND CLAVULANATE POTASSIUM 875; 125 MG/1; MG/1
1 TABLET, FILM COATED ORAL 2 TIMES DAILY
Qty: 14 TABLET | Refills: 0 | Status: SHIPPED | OUTPATIENT
Start: 2022-10-12 | End: 2022-10-19

## 2022-10-12 ASSESSMENT — ENCOUNTER SYMPTOMS
COUGH: 1
RHINORRHEA: 1

## 2022-10-12 NOTE — PROGRESS NOTES
Jeferson Alvarez (:  ) is a Established patient, here for evaluation of the following:    ASSESSMENT/PLAN:  Below is the assessment and plan developed based on review of pertinent history, physical exam, labs, studies, and medications. 1. Acute non-recurrent frontal sinusitis  To take antibiotic course through completion  Antihistamine of choice- Allegra, Zyrtec, Claritin  Mucinex may provide symptom relief  Sinus Flushing 2x day as able followed by nasal steroid inhalation as prescribed  Push fluids  Tylenol/Motrin for discomfort and fever  Sudafed 120mg twice daily if not hypertensive         -flonase  -     amoxicillin-clavulanate (AUGMENTIN) 875-125 MG per tablet; Take 1 tablet by mouth 2 times daily for 7 days, Disp-14 tablet, R-0Normal  Return if symptoms worsen or fail to improve. SUBJECTIVE/OBJECTIVE:  This is a 36 year old patient of   Consenting to a virtual visit today. Complaints include cough, runny nose, congestion. She has been sick 3 weeks. Nasal secretions are brown/green. Her sputum is same color as well. The cough keeps her up at night. She has post nasal drip and coughs mostly at night. Treatments tried at home include mucinex and room humidifier. She has tried equate cough suppressant, which worked. Patient needs to see PCP as she is concerned with some recent weight loss. Review of Systems   HENT:  Positive for congestion, postnasal drip and rhinorrhea. Respiratory:  Positive for cough.       Patient-Reported Vitals 10/12/2022   Patient-Reported Weight 348   Patient-Reported Height 54   Patient-Reported Systolic 883   Patient-Reported Diastolic 85   Patient-Reported Pulse 100   Patient-Reported Temperature 98.6         Physical Exam    [INSTRUCTIONS:  \"[x]\" Indicates a positive item  \"[]\" Indicates a negative item  -- DELETE ALL ITEMS NOT EXAMINED]    Constitutional: [x] Appears well-developed and well-nourished [x] No apparent distress      [] Abnormal - Mental status: [x] Alert and awake  [x] Oriented to person/place/time [x] Able to follow commands    [] Abnormal -     Eyes:   EOM    [x]  Normal    [] Abnormal -   Sclera  [x]  Normal    [] Abnormal -          Discharge [x]  None visible   [] Abnormal -     HENT: [x] Normocephalic, atraumatic  [] Abnormal -   [x] Mouth/Throat: Mucous membranes are moist    External Ears [x] Normal  [] Abnormal -    Neck: [x] No visualized mass [] Abnormal -     Pulmonary/Chest: [x] Respiratory effort normal   [x] No visualized signs of difficulty breathing or respiratory distress        [] Abnormal -      Musculoskeletal:   [x] Normal gait with no signs of ataxia         [x] Normal range of motion of neck        [] Abnormal -     Neurological:        [x] No Facial Asymmetry (Cranial nerve 7 motor function) (limited exam due to video visit)          [x] No gaze palsy        [] Abnormal -          Skin:        [x] No significant exanthematous lesions or discoloration noted on facial skin         [] Abnormal -            Psychiatric:       [x] Normal Affect [] Abnormal -         Other pertinent observable physical exam findings:-      On this date 10/12/2022 I have spent 20 minutes reviewing previous notes, test results and face to face (virtual) with the patient discussing the diagnosis and importance of compliance with the treatment plan as well as documenting on the day of the visitPavel Block was evaluated through a synchronous (real-time) audio-video encounter. The patient (or guardian if applicable) is aware that this is a billable service, which includes applicable co-pays. This Virtual Visit was conducted with patient's (and/or legal guardian's) consent. The visit was conducted pursuant to the emergency declaration under the [address] [place] [address], [address] [place] [address] authority and the [employer] General Act.   Patient identification was verified, and a caregiver was present when appropriate. The patient was located at home in a state where the provider was licensed to provide care.     --SYLVIA Armstrong

## 2022-12-07 DIAGNOSIS — E03.9 HYPOTHYROIDISM, UNSPECIFIED TYPE: ICD-10-CM

## 2022-12-07 DIAGNOSIS — G47.9 DIFFICULTY SLEEPING: ICD-10-CM

## 2022-12-07 RX ORDER — TRAZODONE HYDROCHLORIDE 100 MG/1
TABLET ORAL
Qty: 30 TABLET | Refills: 2 | Status: CANCELLED | OUTPATIENT
Start: 2022-12-07

## 2022-12-07 RX ORDER — LEVOTHYROXINE SODIUM 0.2 MG/1
200 TABLET ORAL DAILY
Qty: 90 TABLET | Refills: 0 | Status: SHIPPED | OUTPATIENT
Start: 2022-12-07

## 2022-12-07 RX ORDER — TRAZODONE HYDROCHLORIDE 100 MG/1
TABLET ORAL
Qty: 30 TABLET | Refills: 2 | Status: SHIPPED | OUTPATIENT
Start: 2022-12-07

## 2022-12-07 RX ORDER — NIFEDIPINE 60 MG/1
60 TABLET, EXTENDED RELEASE ORAL DAILY
Qty: 90 TABLET | Refills: 0 | Status: SHIPPED | OUTPATIENT
Start: 2022-12-07 | End: 2023-01-06

## 2022-12-07 RX ORDER — LEVOTHYROXINE SODIUM 0.03 MG/1
TABLET ORAL
Qty: 45 TABLET | Refills: 1 | Status: SHIPPED | OUTPATIENT
Start: 2022-12-07

## 2022-12-07 NOTE — TELEPHONE ENCOUNTER
Medication:   Requested Prescriptions     Pending Prescriptions Disp Refills    levothyroxine (SYNTHROID) 25 MCG tablet [Pharmacy Med Name: LEVOTHYROXINE 0.025MG (25MCG) TAB] 45 tablet 1     Sig: TAKE 1/2 TABLET BY MOUTH EVERY DAY        Last Filled:  5/31/22    Patient Phone Number: [phone] (home)     Last appt: 7/26/2022   Next appt: Visit date not found    Last OARRS: No flowsheet data found.

## 2022-12-07 NOTE — TELEPHONE ENCOUNTER
Medication:   Requested Prescriptions     Pending Prescriptions Disp Refills    levothyroxine (SYNTHROID) 200 MCG tablet [Pharmacy Med Name: LEVOTHYROXINE 0.2MG (200MCG) TAB] 90 tablet 0     Sig: TAKE 1 TABLET BY MOUTH DAILY        Last Filled:  10/5/22    Patient Phone Number: [phone] (home)     Last appt: 7/26/2022   Next appt: 12/7/2022    Last OARRS: No flowsheet data found.

## 2022-12-07 NOTE — TELEPHONE ENCOUNTER
Medication:   Requested Prescriptions     Pending Prescriptions Disp Refills    traZODone (DESYREL) 100 MG tablet [Pharmacy Med Name: TRAZODONE 100MG TABLETS] 30 tablet 2     Sig: TAKE 1 TABLET BY MOUTH EVERY NIGHT AS NEEDED FOR SLEEP    NIFEdipine (PROCARDIA XL) 60 MG extended release tablet [Pharmacy Med Name: NIFEDIPINE 60MG ER (XL/OS) TABLETS] 90 tablet 0     Sig: TAKE 1 TABLET BY MOUTH DAILY        Last Filled:  Desyrel 9/2/22   procardia 8/19/22    Patient Phone Number: [phone] (home)     Last appt: 7/26/2022   Next appt: 12/7/2022    Last OARRS: No flowsheet data found.

## 2023-01-09 DIAGNOSIS — E03.9 HYPOTHYROIDISM, UNSPECIFIED TYPE: ICD-10-CM

## 2023-01-09 RX ORDER — LEVOTHYROXINE SODIUM 0.03 MG/1
TABLET ORAL
Qty: 45 TABLET | Refills: 1 | Status: SHIPPED | OUTPATIENT
Start: 2023-01-09

## 2023-01-09 RX ORDER — LEVOTHYROXINE SODIUM 0.2 MG/1
200 TABLET ORAL DAILY
Qty: 90 TABLET | Refills: 0 | Status: SHIPPED | OUTPATIENT
Start: 2023-01-09

## 2023-01-09 NOTE — TELEPHONE ENCOUNTER
Medication:   Requested Prescriptions     Pending Prescriptions Disp Refills    levothyroxine (SYNTHROID) 200 MCG tablet 90 tablet 0     Sig: Take 1 tablet by mouth Daily    levothyroxine (SYNTHROID) 25 MCG tablet 45 tablet 1     Sig: TAKE 1/2 TABLET BY MOUTH EVERY DAY     Last Filled:  12/7/2022    Last appt: 7/26/2022   Next appt: Visit date not found    Last OARRS: No flowsheet data found.

## 2023-01-17 RX ORDER — FLUOXETINE HYDROCHLORIDE 60 MG/1
TABLET, FILM COATED ORAL; ORAL
Qty: 90 TABLET | Refills: 0 | Status: SHIPPED | OUTPATIENT
Start: 2023-01-17

## 2023-01-17 NOTE — TELEPHONE ENCOUNTER
Spoke with patient Patient made an appointment for next Monday 1/23/23 for medication refill(Prozac) Last fill date 7/26/22    Patient was seen in ER last night for rectal bleeding. They found a internal hemorrhoid. Patient would like to know how long the bleed should last and when should she become more concerned. Patient stated she has no pain just the bleeding. Care Everywhere has been updated for review.

## 2023-01-17 NOTE — TELEPHONE ENCOUNTER
Medication:   Requested Prescriptions     Pending Prescriptions Disp Refills    FLUoxetine HCl 60 MG TABS [Pharmacy Med Name: FLUOXETINE 60MG TABLETS] 90 tablet      Sig: TAKE 1 TABLET BY MOUTH DAILY        Last Filled:  7/6/22    Patient Phone Number: [phone] (home)     Last appt: 7/26/2022   Next appt: Visit date not found    Last OARRS: No flowsheet data found.

## 2023-01-30 ENCOUNTER — TELEPHONE (OUTPATIENT)
Dept: FAMILY MEDICINE CLINIC | Age: 33
End: 2023-01-30

## 2023-01-30 NOTE — TELEPHONE ENCOUNTER
----- Message from Letty Courtney Dr sent at 1/30/2023  1:20 PM EST -----  Subject: Refill Request    QUESTIONS  Name of Medication? FLUoxetine HCl 60 MG TABS  Patient-reported dosage and instructions? once  How many days do you have left? 0  [facility]? [id number]  Pharmacy phone number (if available)? [phone]  Additional Information for Provider? Completely out for 2 weeks now. Please call in asap, appt RS  -----------------------------------------------------------------------------------------  [address]  What is the best way for the office to contact you? OK to leave message on voicemail  Preferred Call Back Phone Number? [phone]  -----------------------------------------------------------------------------------------  SCRIPT ANSWERS  Relationship to Patient?  Self

## 2023-03-07 RX ORDER — NIFEDIPINE 60 MG/1
60 TABLET, EXTENDED RELEASE ORAL DAILY
Qty: 90 TABLET | Refills: 0 | Status: SHIPPED | OUTPATIENT
Start: 2023-03-07 | End: 2023-04-03 | Stop reason: SDUPTHER

## 2023-03-21 ENCOUNTER — TELEPHONE (OUTPATIENT)
Dept: FAMILY MEDICINE CLINIC | Age: 33
End: 2023-03-21

## 2023-03-21 NOTE — TELEPHONE ENCOUNTER
----- Message from Elmira Bio sent at 3/21/2023  3:29 PM EDT -----  Subject: Refill Request    QUESTIONS  Name of Medication? traZODone (DESYREL) 100 MG tablet  Patient-reported dosage and instructions? 1 pill at night  How many days do you have left? 0  [id number]  Pharmacy phone number (if available)? [phone]  ---------------------------------------------------------------------------  --------------  Kristine REDDY  What is the best way for the office to contact you? OK to leave message on   voicemail  Preferred Call Back Phone Number? [phone]  ---------------------------------------------------------------------------  --------------  SCRIPT ANSWERS  Relationship to Patient?  Self

## 2023-03-21 NOTE — TELEPHONE ENCOUNTER
Pt stopped into the office today insisting she had an appt scheduled for 3/21/23 at 3:20pm.  Advised pt she did not have any appt scheduled and there weren't any available appts for the rest of the day. Pt kept insisting she had an appt today but explained to pt there was no documentation of an appt for today. Pt was offered an appt on 3/27/23 but walked out of building. Pt upset for she is out of her medication. Please advise.

## 2023-04-03 ENCOUNTER — OFFICE VISIT (OUTPATIENT)
Dept: FAMILY MEDICINE CLINIC | Age: 33
End: 2023-04-03

## 2023-04-03 VITALS
BODY MASS INDEX: 50.02 KG/M2 | HEART RATE: 70 BPM | OXYGEN SATURATION: 94 % | SYSTOLIC BLOOD PRESSURE: 134 MMHG | HEIGHT: 64 IN | WEIGHT: 293 LBS | DIASTOLIC BLOOD PRESSURE: 80 MMHG

## 2023-04-03 DIAGNOSIS — Z13.220 SCREENING FOR LIPID DISORDERS: ICD-10-CM

## 2023-04-03 DIAGNOSIS — R63.4 WEIGHT LOSS: ICD-10-CM

## 2023-04-03 DIAGNOSIS — E03.9 HYPOTHYROIDISM, UNSPECIFIED TYPE: Primary | ICD-10-CM

## 2023-04-03 DIAGNOSIS — I10 HYPERTENSION, UNSPECIFIED TYPE: ICD-10-CM

## 2023-04-03 DIAGNOSIS — G47.9 DIFFICULTY SLEEPING: ICD-10-CM

## 2023-04-03 DIAGNOSIS — F41.9 ANXIETY: ICD-10-CM

## 2023-04-03 PROCEDURE — 3079F DIAST BP 80-89 MM HG: CPT | Performed by: NURSE PRACTITIONER

## 2023-04-03 PROCEDURE — 3075F SYST BP GE 130 - 139MM HG: CPT | Performed by: NURSE PRACTITIONER

## 2023-04-03 PROCEDURE — 99212 OFFICE O/P EST SF 10 MIN: CPT | Performed by: NURSE PRACTITIONER

## 2023-04-03 RX ORDER — FLUOXETINE HYDROCHLORIDE 60 MG/1
1 TABLET, FILM COATED ORAL; ORAL DAILY
Qty: 30 TABLET | Refills: 2 | Status: SHIPPED | OUTPATIENT
Start: 2023-04-03

## 2023-04-03 RX ORDER — TRAZODONE HYDROCHLORIDE 100 MG/1
TABLET ORAL
Qty: 30 TABLET | Refills: 2 | Status: SHIPPED | OUTPATIENT
Start: 2023-04-03

## 2023-04-03 RX ORDER — LEVOTHYROXINE SODIUM 0.03 MG/1
TABLET ORAL
Qty: 45 TABLET | Refills: 1 | Status: SHIPPED | OUTPATIENT
Start: 2023-04-03

## 2023-04-03 RX ORDER — LEVOTHYROXINE SODIUM 0.2 MG/1
200 TABLET ORAL DAILY
Qty: 90 TABLET | Refills: 0 | Status: SHIPPED | OUTPATIENT
Start: 2023-04-03

## 2023-04-03 RX ORDER — FLUOXETINE 20 MG/1
20 TABLET, FILM COATED ORAL DAILY
Qty: 30 TABLET | Refills: 2 | Status: SHIPPED | OUTPATIENT
Start: 2023-04-03

## 2023-04-03 RX ORDER — NIFEDIPINE 60 MG/1
60 TABLET, EXTENDED RELEASE ORAL DAILY
Qty: 90 TABLET | Refills: 0 | Status: SHIPPED | OUTPATIENT
Start: 2023-04-03 | End: 2023-05-03

## 2023-04-03 SDOH — ECONOMIC STABILITY: INCOME INSECURITY: HOW HARD IS IT FOR YOU TO PAY FOR THE VERY BASICS LIKE FOOD, HOUSING, MEDICAL CARE, AND HEATING?: NOT HARD AT ALL

## 2023-04-03 SDOH — ECONOMIC STABILITY: FOOD INSECURITY: WITHIN THE PAST 12 MONTHS, THE FOOD YOU BOUGHT JUST DIDN'T LAST AND YOU DIDN'T HAVE MONEY TO GET MORE.: NEVER TRUE

## 2023-04-03 SDOH — ECONOMIC STABILITY: HOUSING INSECURITY
IN THE LAST 12 MONTHS, WAS THERE A TIME WHEN YOU DID NOT HAVE A STEADY PLACE TO SLEEP OR SLEPT IN A SHELTER (INCLUDING NOW)?: NO

## 2023-04-03 SDOH — ECONOMIC STABILITY: FOOD INSECURITY: WITHIN THE PAST 12 MONTHS, YOU WORRIED THAT YOUR FOOD WOULD RUN OUT BEFORE YOU GOT MONEY TO BUY MORE.: NEVER TRUE

## 2023-04-03 ASSESSMENT — PATIENT HEALTH QUESTIONNAIRE - PHQ9
SUM OF ALL RESPONSES TO PHQ QUESTIONS 1-9: 0
SUM OF ALL RESPONSES TO PHQ QUESTIONS 1-9: 0
1. LITTLE INTEREST OR PLEASURE IN DOING THINGS: 0
SUM OF ALL RESPONSES TO PHQ9 QUESTIONS 1 & 2: 0
2. FEELING DOWN, DEPRESSED OR HOPELESS: 0
SUM OF ALL RESPONSES TO PHQ QUESTIONS 1-9: 0
SUM OF ALL RESPONSES TO PHQ QUESTIONS 1-9: 0

## 2023-04-03 NOTE — PROGRESS NOTES
Pili Santos (:  [id number]) is a 29 y.o. female,Established patient, here for evaluation of the following chief complaint(s):  Medication Management       ASSESSMENT/PLAN:  1. Hypothyroidism, unspecified type  Stable;  Patient doing well on synthroid. Recheck TSH. -     levothyroxine (SYNTHROID) 25 MCG tablet; TAKE 1/2 TABLET BY MOUTH EVERY DAY, Disp-45 tablet, R-1Normal  -     TSH; Future  2. Weight loss  Stable;  Discussed patient has lost 12 lbs since last visit. Continue to work on diet and exercise. 3. Difficulty sleeping  Stable; Not progressing; Restart trazodone. -     traZODone (DESYREL) 100 MG tablet; TAKE 1 TABLET BY MOUTH EVERY NIGHT AS NEEDED FOR SLEEP, Disp-30 tablet, R-2Normal  4. Screening for lipid disorders  Stable;  Labs ordered. -     Basic Metabolic Panel; Future  -     Lipid Panel; Future  5. Hypertension, unspecified type  Stable;  BP good at visit. Continue nifedipine. 6. Anxiety  Stable; Not progressing; Increase prozac. Return in about 6 months (around 10/3/2023), or if symptoms worsen or fail to improve. Subjective   SUBJECTIVE/OBJECTIVE:  HPI  Hypothyroidism  No new symptoms  Taking synthroid 225 mcg    Weight loss  Lost 12 lbs  Is working a lot    Difficulty sleeping  Has trouble falling asleep and staying asleep  Unisom lingers to the next day  Has been using Unisom since ran out of Trazodone    HYPERTENSION  BP good at visit  Continues on nifedipine- doing well    Anxiety  Takes something small- feels like it has ruined her life  Does not talk to her  about it- drives her friends nuts  Prozac works for the most part    Review of Systems       Objective   Physical Exam  Vitals reviewed. Constitutional:       Appearance: Normal appearance. HENT:      Head: Normocephalic. Right Ear: External ear normal.      Left Ear: External ear normal.      Nose: Nose normal.   Cardiovascular:      Rate and Rhythm: Normal rate and regular rhythm.

## 2023-04-05 DIAGNOSIS — Z13.220 SCREENING FOR LIPID DISORDERS: ICD-10-CM

## 2023-04-05 DIAGNOSIS — E03.9 HYPOTHYROIDISM, UNSPECIFIED TYPE: ICD-10-CM

## 2023-04-05 LAB
ANION GAP SERPL CALCULATED.3IONS-SCNC: 9 MMOL/L (ref 3–16)
BUN SERPL-MCNC: 7 MG/DL (ref 7–20)
CALCIUM SERPL-MCNC: 9.2 MG/DL (ref 8.3–10.6)
CHLORIDE SERPL-SCNC: 102 MMOL/L (ref 99–110)
CHOLEST SERPL-MCNC: 142 MG/DL (ref 0–199)
CO2 SERPL-SCNC: 26 MMOL/L (ref 21–32)
CREAT SERPL-MCNC: 0.7 MG/DL (ref 0.6–1.1)
GFR SERPLBLD CREATININE-BSD FMLA CKD-EPI: >60 ML/MIN/{1.73_M2}
GLUCOSE SERPL-MCNC: 92 MG/DL (ref 70–99)
HDLC SERPL-MCNC: 35 MG/DL (ref 40–60)
LDLC SERPL CALC-MCNC: 72 MG/DL
POTASSIUM SERPL-SCNC: 4.2 MMOL/L (ref 3.5–5.1)
SODIUM SERPL-SCNC: 137 MMOL/L (ref 136–145)
TRIGL SERPL-MCNC: 175 MG/DL (ref 0–150)
TSH SERPL DL<=0.005 MIU/L-ACNC: 0.15 UIU/ML (ref 0.27–4.2)
VLDLC SERPL CALC-MCNC: 35 MG/DL

## 2023-06-01 RX ORDER — NIFEDIPINE 60 MG/1
60 TABLET, EXTENDED RELEASE ORAL DAILY
Qty: 90 TABLET | Refills: 0 | Status: SHIPPED | OUTPATIENT
Start: 2023-06-01 | End: 2023-07-01

## 2023-06-01 NOTE — TELEPHONE ENCOUNTER
Medication:   Requested Prescriptions     Pending Prescriptions Disp Refills    NIFEdipine (PROCARDIA XL) 60 MG extended release tablet [Pharmacy Med Name: NIFEDIPINE 60MG ER (XL/OS) TABLETS] 90 tablet 0     Sig: TAKE 1 TABLET BY MOUTH DAILY        Last Filled: 4/3/23     Patient Phone Number: [phone] (home)     Last appt: 4/3/2023   Next appt: Visit date not found    Last OARRS: No flowsheet data found.

## 2023-07-17 DIAGNOSIS — G47.9 DIFFICULTY SLEEPING: ICD-10-CM

## 2023-07-17 RX ORDER — TRAZODONE HYDROCHLORIDE 100 MG/1
TABLET ORAL
Qty: 30 TABLET | Refills: 0 | Status: SHIPPED | OUTPATIENT
Start: 2023-07-17

## 2023-07-17 NOTE — TELEPHONE ENCOUNTER
Medication:   Requested Prescriptions     Pending Prescriptions Disp Refills    traZODone (DESYREL) 100 MG tablet [Pharmacy Med Name: traZODone HCl Oral Tablet 100 MG] 30 tablet 0     Sig: TAKE 1 TABLET BY MOUTH EVERY NIGHT AS NEEDED FOR SLEEP. Last Filled:  4.3.2023    Last appt: 4/3/2023   Next appt: 7/17/2023    Last OARRS: No flowsheet data found.

## 2023-08-07 NOTE — TELEPHONE ENCOUNTER
Medication:   Requested Prescriptions     Pending Prescriptions Disp Refills    FLUoxetine (PROZAC) 20 MG tablet [Pharmacy Med Name: FLUoxetine HCl Oral Tablet 20 MG] 30 tablet 0     Sig: Take 1 tablet by mouth daily with 60 mg tablet        Last Filled:  4/3/23    Patient Phone Number: [phone] (home)     Last appt: 4/3/2023   Next appt: Visit date not found    Last OARRS: No flowsheet data found.

## 2023-08-08 RX ORDER — FLUOXETINE 20 MG/1
TABLET, FILM COATED ORAL
Qty: 30 TABLET | Refills: 0 | Status: SHIPPED | OUTPATIENT
Start: 2023-08-08

## 2023-08-29 DIAGNOSIS — E03.9 HYPOTHYROIDISM, UNSPECIFIED TYPE: ICD-10-CM

## 2023-08-29 RX ORDER — LEVOTHYROXINE SODIUM 0.03 MG/1
TABLET ORAL
Qty: 45 TABLET | Refills: 1 | Status: SHIPPED | OUTPATIENT
Start: 2023-08-29

## 2023-08-29 NOTE — TELEPHONE ENCOUNTER
----- Message from Sindy Trujillo sent at 8/29/2023 11:46 AM EDT -----  Subject: Refill Request    QUESTIONS  Name of Medication? levothyroxine (SYNTHROID) 25 MCG tablet  Patient-reported dosage and instructions? one tablet by mouth daily   How many days do you have left? 0  Preferred Pharmacy? [address] [place] [address] [address]  Pharmacy phone number (if available)? [phone]  Additional Information for Provider? PT has a follow up scheduled for   9.5.2023 but us currently out of meds and is asking for refills to be sent   over to pharmacy. Please reach out to PT to discuss. ---------------------------------------------------------------------------  --------------,  Name of Medication? NIFEdipine (PROCARDIA XL) 60 MG extended release   tablet  Patient-reported dosage and instructions? TAKE 1 TABLET BY MOUTH DAILY  How many days do you have left? 0  [facility] [address]  Pharmacy phone number (if available)? [phone]  Additional Information for Provider? PT has a follow up scheduled for   9.5.2023 but us currently out of meds and is asking for refills to be sent   over to pharmacy. Please reach out to PT to discuss. ---------------------------------------------------------------------------  --------------,  Name of Medication? traZODone (DESYREL) 100 MG tablet  Patient-reported dosage and instructions? TAKE 1 TABLET BY MOUTH EVERY   NIGHT AS NEEDED FOR SLEEP. How many days do you have left? 0  [facility] [id number]  Pharmacy phone number (if available)? [phone]  Additional Information for Provider? PT has a follow up scheduled for   9.5.2023 but us currently out of meds and is asking for refills to be sent   over to pharmacy. Please reach out to PT to discuss. ---------------------------------------------------------------------------  --------------  Charlotte Gloss INFO  What is the best way for the office to contact you?  OK to leave message on   voicemail  Preferred

## 2023-09-01 ENCOUNTER — TELEPHONE (OUTPATIENT)
Dept: FAMILY MEDICINE CLINIC | Age: 33
End: 2023-09-01

## 2023-09-01 DIAGNOSIS — G47.9 DIFFICULTY SLEEPING: ICD-10-CM

## 2023-09-01 RX ORDER — NIFEDIPINE 60 MG/1
60 TABLET, EXTENDED RELEASE ORAL DAILY
Qty: 90 TABLET | Refills: 0 | Status: CANCELLED | OUTPATIENT
Start: 2023-09-01 | End: 2023-10-01

## 2023-09-01 RX ORDER — TRAZODONE HYDROCHLORIDE 100 MG/1
100 TABLET ORAL NIGHTLY PRN
Qty: 30 TABLET | Refills: 0 | Status: CANCELLED | OUTPATIENT
Start: 2023-09-01

## 2023-09-05 ENCOUNTER — OFFICE VISIT (OUTPATIENT)
Dept: FAMILY MEDICINE CLINIC | Age: 33
End: 2023-09-05

## 2023-09-05 VITALS
HEART RATE: 80 BPM | HEIGHT: 64 IN | SYSTOLIC BLOOD PRESSURE: 132 MMHG | OXYGEN SATURATION: 96 % | DIASTOLIC BLOOD PRESSURE: 80 MMHG | BODY MASS INDEX: 50.02 KG/M2 | WEIGHT: 293 LBS

## 2023-09-05 DIAGNOSIS — K21.9 GASTROESOPHAGEAL REFLUX DISEASE WITHOUT ESOPHAGITIS: ICD-10-CM

## 2023-09-05 DIAGNOSIS — I10 HYPERTENSION, UNSPECIFIED TYPE: ICD-10-CM

## 2023-09-05 DIAGNOSIS — E03.9 HYPOTHYROIDISM, UNSPECIFIED TYPE: ICD-10-CM

## 2023-09-05 DIAGNOSIS — F41.9 ANXIETY: Primary | ICD-10-CM

## 2023-09-05 DIAGNOSIS — G47.9 DIFFICULTY SLEEPING: ICD-10-CM

## 2023-09-05 PROCEDURE — 99212 OFFICE O/P EST SF 10 MIN: CPT | Performed by: NURSE PRACTITIONER

## 2023-09-05 PROCEDURE — 3074F SYST BP LT 130 MM HG: CPT | Performed by: NURSE PRACTITIONER

## 2023-09-05 PROCEDURE — 3078F DIAST BP <80 MM HG: CPT | Performed by: NURSE PRACTITIONER

## 2023-09-05 RX ORDER — LEVOTHYROXINE SODIUM 0.03 MG/1
TABLET ORAL
Qty: 45 TABLET | Refills: 1 | Status: SHIPPED | OUTPATIENT
Start: 2023-09-05

## 2023-09-05 RX ORDER — TRAZODONE HYDROCHLORIDE 100 MG/1
TABLET ORAL
Qty: 30 TABLET | Refills: 0 | Status: SHIPPED | OUTPATIENT
Start: 2023-09-05

## 2023-09-05 RX ORDER — FLUOXETINE 20 MG/1
TABLET, FILM COATED ORAL
Qty: 30 TABLET | Refills: 0 | Status: SHIPPED | OUTPATIENT
Start: 2023-09-05

## 2023-09-05 RX ORDER — FLUOXETINE HYDROCHLORIDE 60 MG/1
1 TABLET, FILM COATED ORAL; ORAL DAILY
Qty: 30 TABLET | Refills: 2 | Status: SHIPPED | OUTPATIENT
Start: 2023-09-05

## 2023-09-05 RX ORDER — LEVOTHYROXINE SODIUM 0.2 MG/1
200 TABLET ORAL DAILY
Qty: 90 TABLET | Refills: 1 | Status: SHIPPED | OUTPATIENT
Start: 2023-09-05

## 2023-09-05 RX ORDER — OMEPRAZOLE 20 MG/1
20 CAPSULE, DELAYED RELEASE ORAL DAILY
Qty: 30 CAPSULE | Refills: 2 | Status: SHIPPED | OUTPATIENT
Start: 2023-09-05

## 2023-09-05 NOTE — PROGRESS NOTES
Eleazar Judd (:  ) is a 29 y.o. female,Established patient, here for evaluation of the following chief complaint(s):  Medication Refill       ASSESSMENT/PLAN:  1. Anxiety  Stable;  Patient doing well on Prozac. Continue current regimen. 2. Hypothyroidism, unspecified type  Stable;  Patient ran out of extra 25 mcg synthroid, but has recently restarted. Will recheck TSH level in 6-8 weeks. -     levothyroxine (SYNTHROID) 200 MCG tablet; Take 1 tablet by mouth daily, Disp-90 tablet, R-1Normal  -     levothyroxine (SYNTHROID) 25 MCG tablet; TAKE 1/2 TABLET BY MOUTH EVERY DAY, Disp-45 tablet, R-1Normal  -     TSH; Future  3. Gastroesophageal reflux disease without esophagitis  Stable;  Patient doing well on omeprazole. Continue current regimen. 4. Hypertension, unspecified type  Stable;  Patient doing well on procardia. Continue current regimen. Return in about 6 months (around 3/5/2024). Subjective   SUBJECTIVE/OBJECTIVE:  HPI  Anxiety  Mood is good  Sometimes short with her kids  Is on Prozac 80 mg  Can have nightsweats- started after taking Prozac    Hypothyroidism  Lost a prescription  Has been really hot  Sweating a lot at work  225 mcg synthroid    GERD  Taking omeprazole    HYPERTENSION  Doing well on procardia    Review of Systems       Objective   Physical Exam  Vitals reviewed. Constitutional:       Appearance: Normal appearance. HENT:      Head: Normocephalic. Right Ear: External ear normal.      Left Ear: External ear normal.      Nose: Nose normal.   Pulmonary:      Effort: No respiratory distress. Neurological:      Mental Status: She is alert. Psychiatric:         Mood and Affect: Mood normal.     An electronic signature was used to authenticate this note.     --José Miguel Ball, SYLVIA - CNP

## 2023-09-05 NOTE — TELEPHONE ENCOUNTER
90 day supply requested by pt for Procardia 60 mg. Send to [facility] [address] in [facility]. Pharmacy already requested her Trazodone Rx. Pt would like to have a 90 day supply of that also.

## 2023-10-12 DIAGNOSIS — G47.9 DIFFICULTY SLEEPING: ICD-10-CM

## 2023-10-12 NOTE — TELEPHONE ENCOUNTER
PT requests Urgent refill for Trazodone 100 mg tab, Fluoxetine HCI 60 mg and 20 mg tab,  To [address]    She states she's been out of medication for a few days and can't sleep/function

## 2023-10-16 RX ORDER — TRAZODONE HYDROCHLORIDE 100 MG/1
TABLET ORAL
Qty: 30 TABLET | Refills: 0 | Status: SHIPPED | OUTPATIENT
Start: 2023-10-16

## 2023-10-16 RX ORDER — FLUOXETINE 20 MG/1
TABLET, FILM COATED ORAL
Qty: 30 TABLET | Refills: 0 | Status: SHIPPED | OUTPATIENT
Start: 2023-10-16

## 2023-10-16 RX ORDER — FLUOXETINE HYDROCHLORIDE 60 MG/1
1 TABLET, FILM COATED ORAL; ORAL DAILY
Qty: 30 TABLET | Refills: 2 | Status: SHIPPED | OUTPATIENT
Start: 2023-10-16

## 2023-11-17 DIAGNOSIS — E03.9 HYPOTHYROIDISM, UNSPECIFIED TYPE: ICD-10-CM

## 2023-11-17 DIAGNOSIS — G47.9 DIFFICULTY SLEEPING: ICD-10-CM

## 2023-11-17 RX ORDER — LEVOTHYROXINE SODIUM 0.03 MG/1
TABLET ORAL
Qty: 45 TABLET | Refills: 1 | Status: SHIPPED | OUTPATIENT
Start: 2023-11-17

## 2023-11-17 RX ORDER — TRAZODONE HYDROCHLORIDE 100 MG/1
TABLET ORAL
Qty: 30 TABLET | Refills: 0 | Status: SHIPPED | OUTPATIENT
Start: 2023-11-17

## 2023-11-17 RX ORDER — LEVOTHYROXINE SODIUM 0.2 MG/1
200 TABLET ORAL DAILY
Qty: 90 TABLET | Refills: 1 | Status: SHIPPED | OUTPATIENT
Start: 2023-11-17

## 2023-11-17 NOTE — TELEPHONE ENCOUNTER
----- Message from Afsaneh Ricardo sent at 11/17/2023 12:27 PM EST -----  Subject: Refill Request    QUESTIONS  Name of Medication? traZODone (DESYREL) 100 MG tablet  Patient-reported dosage and instructions? 100mg one at night  How many days do you have left? 0  Preferred Pharmacy? [address] [place] [address] [address]  Pharmacy phone number (if available)? [phone]  ---------------------------------------------------------------------------  --------------,  Name of Medication? levothyroxine (SYNTHROID) 25 MCG tablet  Patient-reported dosage and instructions? 25mg one once per day  How many days do you have left? 0  Preferred Pharmacy? [address] [place] [address] [address]  Pharmacy phone number (if available)? [phone]  ---------------------------------------------------------------------------  --------------,  Name of Medication? levothyroxine (SYNTHROID) 200 MCG tablet  Patient-reported dosage and instructions? 200mg once per day  How many days do you have left? 0  Preferred Pharmacy? [address] [place] [address] [address]  Pharmacy phone number (if available)? [phone]  ---------------------------------------------------------------------------  --------------  Sheridan REDDY  What is the best way for the office to contact you? OK to leave message on   voicemail  Preferred Call Back Phone Number? [phone]  ---------------------------------------------------------------------------  --------------  SCRIPT ANSWERS  Relationship to Patient?  Self

## 2023-11-27 ENCOUNTER — HOSPITAL ENCOUNTER (OUTPATIENT)
Dept: WOMENS IMAGING | Age: 33
Discharge: HOME | End: 2023-11-27

## 2023-11-27 ENCOUNTER — OFFICE VISIT (OUTPATIENT)
Dept: FAMILY MEDICINE CLINIC | Age: 33
End: 2023-11-27

## 2023-11-27 ENCOUNTER — HOSPITAL ENCOUNTER (OUTPATIENT)
Dept: ULTRASOUND IMAGING | Age: 33
Discharge: HOME | End: 2023-11-27

## 2023-11-27 VITALS — WEIGHT: 293 LBS | HEIGHT: 63 IN | BODY MASS INDEX: 51.91 KG/M2

## 2023-11-27 VITALS
WEIGHT: 293 LBS | DIASTOLIC BLOOD PRESSURE: 71 MMHG | HEIGHT: 63 IN | HEART RATE: 83 BPM | BODY MASS INDEX: 51.91 KG/M2 | OXYGEN SATURATION: 96 % | SYSTOLIC BLOOD PRESSURE: 103 MMHG

## 2023-11-27 DIAGNOSIS — G47.9 DIFFICULTY SLEEPING: ICD-10-CM

## 2023-11-27 DIAGNOSIS — E03.9 HYPOTHYROIDISM, UNSPECIFIED TYPE: ICD-10-CM

## 2023-11-27 DIAGNOSIS — L98.9 SKIN LESION: ICD-10-CM

## 2023-11-27 DIAGNOSIS — N64.9 BREAST LESION: ICD-10-CM

## 2023-11-27 DIAGNOSIS — N64.9 BREAST LESION: Primary | ICD-10-CM

## 2023-11-27 PROCEDURE — 99213 OFFICE O/P EST LOW 20 MIN: CPT | Performed by: NURSE PRACTITIONER

## 2023-11-27 PROCEDURE — G0279 TOMOSYNTHESIS, MAMMO: HCPCS

## 2023-11-27 PROCEDURE — 76642 ULTRASOUND BREAST LIMITED: CPT

## 2023-11-27 RX ORDER — TRAZODONE HYDROCHLORIDE 100 MG/1
TABLET ORAL
Qty: 30 TABLET | Refills: 0 | Status: SHIPPED | OUTPATIENT
Start: 2023-11-27

## 2023-11-27 RX ORDER — LEVOTHYROXINE SODIUM 0.2 MG/1
200 TABLET ORAL DAILY
Qty: 90 TABLET | Refills: 1 | Status: SHIPPED | OUTPATIENT
Start: 2023-11-27

## 2023-11-27 RX ORDER — FLUOXETINE 20 MG/1
TABLET, FILM COATED ORAL
Qty: 30 TABLET | Refills: 0 | Status: SHIPPED | OUTPATIENT
Start: 2023-11-27

## 2023-11-27 RX ORDER — CEPHALEXIN 500 MG/1
500 CAPSULE ORAL 3 TIMES DAILY
Qty: 21 CAPSULE | Refills: 0 | Status: SHIPPED | OUTPATIENT
Start: 2023-11-27 | End: 2023-12-04

## 2023-11-27 RX ORDER — LEVOTHYROXINE SODIUM 0.03 MG/1
TABLET ORAL
Qty: 45 TABLET | Refills: 1 | Status: SHIPPED | OUTPATIENT
Start: 2023-11-27

## 2023-11-27 ASSESSMENT — ENCOUNTER SYMPTOMS
ABDOMINAL PAIN: 0
CHEST TIGHTNESS: 0
BLOOD IN STOOL: 0
COUGH: 0
COLOR CHANGE: 1
WHEEZING: 0
SHORTNESS OF BREATH: 0
TROUBLE SWALLOWING: 0
VOMITING: 0
NAUSEA: 0
EYE DISCHARGE: 0
SINUS PRESSURE: 0
EYE ITCHING: 0
EYE PAIN: 0
PHOTOPHOBIA: 0
EYE REDNESS: 0
DIARRHEA: 0
STRIDOR: 0
BACK PAIN: 0
CHOKING: 0
CONSTIPATION: 0
SORE THROAT: 0
SINUS PAIN: 0
VOICE CHANGE: 0
RHINORRHEA: 0

## 2023-11-27 NOTE — TELEPHONE ENCOUNTER
----- Message from Anthony Jamaira sent at 11/27/2023 12:29 PM EST -----  Subject: Refill Request    QUESTIONS  Name of Medication? levothyroxine (SYNTHROID) 200 MCG tablet  Patient-reported dosage and instructions? 1x daily  How many days do you have left? 0  Preferred Pharmacy? [address] [place], [place] [id number]  Pharmacy phone number (if available)? [phone]  ---------------------------------------------------------------------------  --------------,  Name of Medication? levothyroxine (SYNTHROID) 25 MCG tablet  Patient-reported dosage and instructions? 1x daily  How many days do you have left? 0  Preferred Pharmacy? [address] [place], [place] [id number]  Pharmacy phone number (if available)? [phone]  ---------------------------------------------------------------------------  --------------,  Name of Medication? FLUoxetine (PROZAC) 20 MG tablet  Patient-reported dosage and instructions? 1x daily  How many days do you have left? 0  Preferred Pharmacy? [address] [place] [address] [address]  Pharmacy phone number (if available)? [phone]  ---------------------------------------------------------------------------  --------------,  Name of Medication? traZODone (DESYREL) 100 MG tablet  Patient-reported dosage and instructions? 1x daily  How many days do you have left? 0  Preferred Pharmacy? [address] [place] [address] [address]  Pharmacy phone number (if available)? [phone]  Additional Information for Provider? Patient saw Alycia Allan today   11/27/23 and forgot to go over the medication refills she needs. Please   call patient to advise if she can get her meds refilled. ---------------------------------------------------------------------------  --------------  Nathen REDDY  What is the best way for the office to contact you? OK to leave message on   voicemail  Preferred Call Back Phone Number? [phone]  ---------------------------------------------------------------------------  --------------  SCRIPT ANSWERS  Relationship to Patient?  Self

## 2023-11-27 NOTE — TELEPHONE ENCOUNTER
Medication:   Requested Prescriptions     Pending Prescriptions Disp Refills    levothyroxine (SYNTHROID) 200 MCG tablet 90 tablet 1     Sig: Take 1 tablet by mouth daily    levothyroxine (SYNTHROID) 25 MCG tablet 45 tablet 1     Sig: TAKE 1/2 TABLET BY MOUTH EVERY DAY    FLUoxetine (PROZAC) 20 MG tablet 30 tablet 0     Sig: Take 1 tablet by mouth daily with 60 mg tablet    traZODone (DESYREL) 100 MG tablet 30 tablet 0     Sig: TAKE 1 TABLET BY MOUTH AT BEDTIME AS NEEDED FOR SLEEP        Last Filled:      Patient Phone Number: [phone] (home)     Last appt: 11/27/2023   Next appt: Visit date not found    Last OARRS:        No data to display

## 2023-11-27 NOTE — PROGRESS NOTES
and voice change. Eyes:  Negative for photophobia, pain, discharge, redness and itching. Respiratory:  Negative for cough, choking, chest tightness, shortness of breath, wheezing and stridor. Cardiovascular:  Negative for chest pain, palpitations and leg swelling. Gastrointestinal:  Negative for abdominal pain, blood in stool, constipation, diarrhea, nausea and vomiting. Endocrine: Negative for cold intolerance, heat intolerance, polydipsia and polyuria. Genitourinary:  Negative for difficulty urinating, dysuria, enuresis, flank pain, frequency, hematuria and urgency. Musculoskeletal:  Negative for back pain, gait problem, joint swelling, neck pain and neck stiffness. Skin:  Positive for color change. Negative for pallor, rash and wound. Allergic/Immunologic: Negative for environmental allergies and food allergies. Neurological:  Negative for dizziness, tremors, syncope, speech difficulty, weakness, light-headedness, numbness and headaches. Hematological:  Negative for adenopathy. Does not bruise/bleed easily. Psychiatric/Behavioral:  Negative for agitation, behavioral problems, confusion, decreased concentration, dysphoric mood, hallucinations, self-injury, sleep disturbance and suicidal ideas. The patient is not nervous/anxious and is not hyperactive. Prior to Visit Medications    Medication Sig Taking?  Authorizing Provider   cephALEXin (KEFLEX) 500 MG capsule Take 1 capsule by mouth 3 times daily for 7 days Yes SYLVIA Gandhi CNP   levothyroxine (SYNTHROID) 200 MCG tablet Take 1 tablet by mouth daily Yes Doris Maldonado APRN - CNP   levothyroxine (SYNTHROID) 25 MCG tablet TAKE 1/2 TABLET BY MOUTH EVERY DAY Yes Doris Maldonado APRN - CNP   traZODone (DESYREL) 100 MG tablet TAKE 1 TABLET BY MOUTH AT BEDTIME AS NEEDED FOR SLEEP Yes Doris Maldonado APRN - CNP   FLUoxetine (PROZAC) 20 MG tablet Take 1 tablet by mouth daily with 60 mg tablet Yes Erica

## 2023-11-28 RX ORDER — NIFEDIPINE 60 MG/1
60 TABLET, EXTENDED RELEASE ORAL DAILY
Qty: 90 TABLET | Refills: 0 | Status: SHIPPED | OUTPATIENT
Start: 2023-11-28 | End: 2023-12-28

## 2023-11-28 NOTE — TELEPHONE ENCOUNTER
----- Message from Deanne Christensen sent at 11/27/2023 12:26 PM EST -----  Subject: Referral Request    Reason for referral request? Dermatology  Provider patient wants to be referred to(if known):     Provider Phone Number(if known): Additional Information for Provider? Patient saw Loki Braxton today   11/27/23 and was sent for a mammogram and US. Patient was told if those   were normal she would need to see dermatology.  Patient received normal   results and would like a dermatology referral. Please call the patient to   advise.   ---------------------------------------------------------------------------  --------------  [address]    [phone]; OK to leave message on voicemail  ---------------------------------------------------------------------------  --------------

## 2023-11-28 NOTE — TELEPHONE ENCOUNTER
Medication:   Requested Prescriptions     Pending Prescriptions Disp Refills    NIFEdipine (PROCARDIA XL) 60 MG extended release tablet [Pharmacy Med Name: NIFEDIPINE 60MG ER (XL/OS) TABLETS] 90 tablet 0     Sig: TAKE 1 TABLET BY MOUTH DAILY        Last Filled:  06/01/2023    Patient Phone Number: [phone] (home)     Last appt: 11/27/2023   Next appt: Visit date not found    Last OARRS:        No data to display

## 2023-11-28 NOTE — TELEPHONE ENCOUNTER
Patient called office to confirm referral referral for Dermatology is in, advised to patient it is and patient states she will call and make appointment.

## 2024-01-02 ENCOUNTER — TELEPHONE (OUTPATIENT)
Dept: FAMILY MEDICINE CLINIC | Age: 34
End: 2024-01-02

## 2024-01-02 DIAGNOSIS — G47.9 DIFFICULTY SLEEPING: ICD-10-CM

## 2024-01-02 RX ORDER — FLUOXETINE 20 MG/1
TABLET, FILM COATED ORAL
Qty: 30 TABLET | Refills: 0 | Status: SHIPPED | OUTPATIENT
Start: 2024-01-02

## 2024-01-02 RX ORDER — NIFEDIPINE 60 MG/1
60 TABLET, EXTENDED RELEASE ORAL DAILY
Qty: 90 TABLET | Refills: 0 | Status: SHIPPED | OUTPATIENT
Start: 2024-01-02

## 2024-01-02 RX ORDER — FLUOXETINE HYDROCHLORIDE 60 MG/1
1 TABLET, FILM COATED ORAL; ORAL DAILY
Qty: 30 TABLET | Refills: 2 | Status: SHIPPED | OUTPATIENT
Start: 2024-01-02

## 2024-01-02 RX ORDER — TRAZODONE HYDROCHLORIDE 100 MG/1
TABLET ORAL
Qty: 30 TABLET | Refills: 0 | Status: SHIPPED | OUTPATIENT
Start: 2024-01-02

## 2024-01-02 NOTE — TELEPHONE ENCOUNTER
Nifedipine 60 mg  Fluoxetine 60 mg  Fluoxetine 20 mg  Trazodone 100 mg  [facility] [address]    Patient is calling in and would like to know if this can be called in asa. She is out of medication.

## 2024-02-05 ENCOUNTER — TELEMEDICINE (OUTPATIENT)
Dept: FAMILY MEDICINE CLINIC | Age: 34
End: 2024-02-05

## 2024-02-05 DIAGNOSIS — G47.9 DIFFICULTY SLEEPING: ICD-10-CM

## 2024-02-05 DIAGNOSIS — E03.9 HYPOTHYROIDISM, UNSPECIFIED TYPE: ICD-10-CM

## 2024-02-05 DIAGNOSIS — B96.89 ACUTE BACTERIAL SINUSITIS: Primary | ICD-10-CM

## 2024-02-05 DIAGNOSIS — J01.90 ACUTE BACTERIAL SINUSITIS: Primary | ICD-10-CM

## 2024-02-05 PROCEDURE — 99212 OFFICE O/P EST SF 10 MIN: CPT | Performed by: NURSE PRACTITIONER

## 2024-02-05 RX ORDER — AMOXICILLIN 875 MG/1
875 TABLET, COATED ORAL 2 TIMES DAILY
Qty: 20 TABLET | Refills: 0 | Status: SHIPPED | OUTPATIENT
Start: 2024-02-05 | End: 2024-02-15

## 2024-02-05 RX ORDER — FLUOXETINE HYDROCHLORIDE 60 MG/1
1 TABLET, FILM COATED ORAL; ORAL DAILY
Qty: 30 TABLET | Refills: 2 | Status: SHIPPED | OUTPATIENT
Start: 2024-02-05

## 2024-02-05 RX ORDER — TRAZODONE HYDROCHLORIDE 100 MG/1
TABLET ORAL
Qty: 30 TABLET | Refills: 0 | Status: SHIPPED | OUTPATIENT
Start: 2024-02-05

## 2024-02-05 RX ORDER — LEVOTHYROXINE SODIUM 0.2 MG/1
200 TABLET ORAL DAILY
Qty: 90 TABLET | Refills: 1 | Status: SHIPPED | OUTPATIENT
Start: 2024-02-05

## 2024-02-05 RX ORDER — FLUOXETINE 20 MG/1
TABLET, FILM COATED ORAL
Qty: 30 TABLET | Refills: 0 | Status: SHIPPED | OUTPATIENT
Start: 2024-02-05

## 2024-02-05 RX ORDER — LEVOTHYROXINE SODIUM 0.03 MG/1
TABLET ORAL
Qty: 45 TABLET | Refills: 1 | Status: SHIPPED | OUTPATIENT
Start: 2024-02-05

## 2024-02-05 NOTE — PROGRESS NOTES
Hedy Khan, was evaluated through a synchronous (real-time) audio-video encounter. The patient (or guardian if applicable) is aware that this is a billable service, which includes applicable co-pays. This Virtual Visit was conducted with patient's (and/or legal guardian's) consent. Patient identification was verified, and a caregiver was present when appropriate.   The patient was located at Home: [address] [place] [address] 1  [place] [address]  Provider was located at Facility (Appt Dept): [address] [place] [address],  [place] [address]      Hedy Khan (:  1990) is a Established patient, presenting virtually for evaluation of the following:    Assessment & Plan   Below is the assessment and plan developed based on review of pertinent history, physical exam, labs, studies, and medications.  1. Acute bacterial sinusitis  Not progressing;  Begin Amox.  Discussed steam, humidifier, flonase and fluids PRN.     Return if symptoms worsen or fail to improve.       Subjective   HPI  Symptoms started 2 weeks ago  Sinus pressure- frontal, maxillary  Congestion  No runny nose  Headaches- frontal  A little cough- dry  No fevers or chills  No body aches or myalgias  Tylenol cold and flu- no improvement  Cloves of garlic up nose- no improvement    Has not taken a COVID test    Review of Systems       Objective   Patient-Reported Vitals  No data recorded     Physical Exam  Constitutional:       Appearance: Normal appearance.   HENT:      Head: Normocephalic.      Right Ear: External ear normal.      Left Ear: External ear normal.      Nose: Congestion present.      Right Sinus: Maxillary sinus tenderness and frontal sinus tenderness present.      Left Sinus: Maxillary sinus tenderness and frontal sinus tenderness present.   Pulmonary:      Effort: No respiratory distress.   Neurological:      Mental Status: She is alert.   Psychiatric:         Mood and Affect: Mood normal.       --Ella Maldonado, SYLVIA -

## 2024-02-05 NOTE — TELEPHONE ENCOUNTER
----- Message from Tania Back sent at 2/5/2024  9:45 AM EST -----  Subject: Refill Request    QUESTIONS  Name of Medication? traZODone (DESYREL) 100 MG tablet  Patient-reported dosage and instructions? 100 MG 1 time a day  How many days do you have left? 2  Preferred Pharmacy? [id number]  Pharmacy phone number (if available)? [phone]  ---------------------------------------------------------------------------  --------------,  Name of Medication? FLUoxetine (PROZAC) 20 MG tablet  Patient-reported dosage and instructions? 20 Mg 1 time a day  How many days do you have left? 0  [facility]? [id number]  Pharmacy phone number (if available)? [phone]  ---------------------------------------------------------------------------  --------------,  Name of Medication? FLUoxetine HCl 60 MG TABS  Patient-reported dosage and instructions? 60 MG 1 time a day  How many days do you have left? 0  Preferred Pharmacy? [facility]  Pharmacy phone number (if available)? [phone]  ---------------------------------------------------------------------------  --------------,  Name of Medication? levothyroxine (SYNTHROID) 200 MCG tablet  Patient-reported dosage and instructions? 200 MCG 1 time a day  How many days do you have left? 2  Preferred Pharmacy? [id number]  Pharmacy phone number (if available)? [phone]  ---------------------------------------------------------------------------  --------------,  Name of Medication? levothyroxine (SYNTHROID) 25 MCG tablet  Patient-reported dosage and instructions? 25 MCG 1 time a day  How many days do you have left? 0  Preferred Pharmacy? [id number]  Pharmacy phone number (if available)? [phone]  ---------------------------------------------------------------------------  --------------  CALL BACK INFO  What is the best way for the office to contact you? OK to leave message on   voicemail  Preferred Call Back Phone

## 2024-03-12 DIAGNOSIS — G47.9 DIFFICULTY SLEEPING: ICD-10-CM

## 2024-03-13 RX ORDER — TRAZODONE HYDROCHLORIDE 100 MG/1
TABLET ORAL
Qty: 30 TABLET | Refills: 0 | Status: SHIPPED | OUTPATIENT
Start: 2024-03-13

## 2024-03-13 NOTE — TELEPHONE ENCOUNTER
Medication:   Requested Prescriptions     Pending Prescriptions Disp Refills    traZODone (DESYREL) 100 MG tablet [Pharmacy Med Name: traZODone HCl Oral Tablet 100 MG] 30 tablet 0     Sig: TAKE 1 TABLET BY MOUTH AT BEDTIME AS NEEDED FOR SLEEP        Last Filled:  2/5/24    Patient Phone Number: [phone] (home)     Last appt: 2/5/2024   Next appt: Visit date not found    Last OARRS:        No data to display

## 2024-04-15 DIAGNOSIS — E03.9 HYPOTHYROIDISM, UNSPECIFIED TYPE: ICD-10-CM

## 2024-04-15 DIAGNOSIS — G47.9 DIFFICULTY SLEEPING: ICD-10-CM

## 2024-04-15 NOTE — TELEPHONE ENCOUNTER
Medication:   Requested Prescriptions     Pending Prescriptions Disp Refills    traZODone (DESYREL) 100 MG tablet 30 tablet 0     Sig: Take 1 tablet by mouth nightly as needed for Sleep    FLUoxetine (PROZAC) 20 MG tablet 30 tablet 0     Sig: Take 1 tablet by mouth daily with 60 mg tablet    FLUoxetine HCl 60 MG TABS 30 tablet 2     Sig: Take 1 tablet by mouth daily Take with 20 mg    NIFEdipine (PROCARDIA XL) 60 MG extended release tablet 90 tablet 0     Sig: Take 1 tablet by mouth daily    levothyroxine (SYNTHROID) 25 MCG tablet 45 tablet 1     Sig: TAKE 1/2 TABLET BY MOUTH EVERY DAY    levothyroxine (SYNTHROID) 200 MCG tablet 90 tablet 1     Sig: Take 1 tablet by mouth daily        Last Filled:  3/13/24 , 2/5/24    Patient Phone Number: [phone] (home)     Last appt: 2/5/2024   Next appt: Visit date not found    Last OARRS:        No data to display

## 2024-04-17 RX ORDER — NIFEDIPINE 60 MG/1
60 TABLET, EXTENDED RELEASE ORAL DAILY
Qty: 90 TABLET | Refills: 0 | Status: SHIPPED | OUTPATIENT
Start: 2024-04-17

## 2024-04-17 RX ORDER — FLUOXETINE HYDROCHLORIDE 60 MG/1
1 TABLET, FILM COATED ORAL; ORAL DAILY
Qty: 30 TABLET | Refills: 2 | Status: SHIPPED | OUTPATIENT
Start: 2024-04-17

## 2024-04-17 RX ORDER — LEVOTHYROXINE SODIUM 0.2 MG/1
200 TABLET ORAL DAILY
Qty: 90 TABLET | Refills: 1 | Status: SHIPPED | OUTPATIENT
Start: 2024-04-17

## 2024-04-17 RX ORDER — LEVOTHYROXINE SODIUM 0.03 MG/1
TABLET ORAL
Qty: 45 TABLET | Refills: 1 | Status: SHIPPED | OUTPATIENT
Start: 2024-04-17

## 2024-04-17 RX ORDER — TRAZODONE HYDROCHLORIDE 100 MG/1
100 TABLET ORAL NIGHTLY PRN
Qty: 30 TABLET | Refills: 0 | Status: SHIPPED | OUTPATIENT
Start: 2024-04-17

## 2024-04-17 RX ORDER — FLUOXETINE 20 MG/1
TABLET, FILM COATED ORAL
Qty: 30 TABLET | Refills: 0 | Status: SHIPPED | OUTPATIENT
Start: 2024-04-17

## 2024-04-22 DIAGNOSIS — E03.9 HYPOTHYROIDISM, UNSPECIFIED TYPE: ICD-10-CM

## 2024-04-22 LAB — TSH SERPL DL<=0.005 MIU/L-ACNC: 0.94 UIU/ML (ref 0.27–4.2)

## 2024-05-23 ENCOUNTER — OFFICE VISIT (OUTPATIENT)
Dept: FAMILY MEDICINE CLINIC | Age: 34
End: 2024-05-23
Payer: COMMERCIAL

## 2024-05-23 VITALS
BODY MASS INDEX: 51.91 KG/M2 | WEIGHT: 293 LBS | SYSTOLIC BLOOD PRESSURE: 132 MMHG | OXYGEN SATURATION: 95 % | HEIGHT: 63 IN | DIASTOLIC BLOOD PRESSURE: 76 MMHG | HEART RATE: 94 BPM

## 2024-05-23 DIAGNOSIS — G47.9 DIFFICULTY SLEEPING: ICD-10-CM

## 2024-05-23 DIAGNOSIS — E03.9 HYPOTHYROIDISM, UNSPECIFIED TYPE: ICD-10-CM

## 2024-05-23 DIAGNOSIS — F41.9 ANXIETY: ICD-10-CM

## 2024-05-23 DIAGNOSIS — M54.50 RIGHT-SIDED LOW BACK PAIN WITHOUT SCIATICA, UNSPECIFIED CHRONICITY: Primary | ICD-10-CM

## 2024-05-23 DIAGNOSIS — I10 HYPERTENSION, UNSPECIFIED TYPE: ICD-10-CM

## 2024-05-23 LAB
BILIRUBIN, POC: ABNORMAL
BLOOD URINE, POC: ABNORMAL
CLARITY, POC: ABNORMAL
COLOR, POC: ABNORMAL
GLUCOSE URINE, POC: ABNORMAL
KETONES, POC: ABNORMAL
LEUKOCYTE EST, POC: ABNORMAL
NITRITE, POC: ABNORMAL
PH, POC: 6.5
PROTEIN, POC: ABNORMAL
SPECIFIC GRAVITY, POC: 1.02
UROBILINOGEN, POC: 0.2

## 2024-05-23 PROCEDURE — 99214 OFFICE O/P EST MOD 30 MIN: CPT | Performed by: NURSE PRACTITIONER

## 2024-05-23 PROCEDURE — 3075F SYST BP GE 130 - 139MM HG: CPT | Performed by: NURSE PRACTITIONER

## 2024-05-23 PROCEDURE — 81002 URINALYSIS NONAUTO W/O SCOPE: CPT | Performed by: NURSE PRACTITIONER

## 2024-05-23 PROCEDURE — 3078F DIAST BP <80 MM HG: CPT | Performed by: NURSE PRACTITIONER

## 2024-05-23 RX ORDER — TRAZODONE HYDROCHLORIDE 100 MG/1
100 TABLET ORAL NIGHTLY PRN
Qty: 90 TABLET | Refills: 1 | Status: SHIPPED | OUTPATIENT
Start: 2024-05-23

## 2024-05-23 RX ORDER — FLUOXETINE 20 MG/1
80 TABLET, FILM COATED ORAL DAILY
Qty: 360 TABLET | Refills: 1 | Status: SHIPPED | OUTPATIENT
Start: 2024-05-23

## 2024-05-23 SDOH — ECONOMIC STABILITY: INCOME INSECURITY: HOW HARD IS IT FOR YOU TO PAY FOR THE VERY BASICS LIKE FOOD, HOUSING, MEDICAL CARE, AND HEATING?: NOT HARD AT ALL

## 2024-05-23 SDOH — ECONOMIC STABILITY: FOOD INSECURITY: WITHIN THE PAST 12 MONTHS, YOU WORRIED THAT YOUR FOOD WOULD RUN OUT BEFORE YOU GOT MONEY TO BUY MORE.: NEVER TRUE

## 2024-05-23 SDOH — ECONOMIC STABILITY: FOOD INSECURITY: WITHIN THE PAST 12 MONTHS, THE FOOD YOU BOUGHT JUST DIDN'T LAST AND YOU DIDN'T HAVE MONEY TO GET MORE.: NEVER TRUE

## 2024-05-23 ASSESSMENT — PATIENT HEALTH QUESTIONNAIRE - PHQ9
SUM OF ALL RESPONSES TO PHQ QUESTIONS 1-9: 0
2. FEELING DOWN, DEPRESSED OR HOPELESS: NOT AT ALL
1. LITTLE INTEREST OR PLEASURE IN DOING THINGS: NOT AT ALL
SUM OF ALL RESPONSES TO PHQ QUESTIONS 1-9: 0
SUM OF ALL RESPONSES TO PHQ9 QUESTIONS 1 & 2: 0

## 2024-05-23 NOTE — PROGRESS NOTES
Nose: Nose normal.   Cardiovascular:      Rate and Rhythm: Normal rate and regular rhythm.      Heart sounds: Normal heart sounds, S1 normal and S2 normal.   Pulmonary:      Effort: Pulmonary effort is normal.      Breath sounds: Normal breath sounds and air entry.   Abdominal:      Tenderness: There is no right CVA tenderness or left CVA tenderness.   Musculoskeletal:      Lumbar back: Tenderness present. Normal range of motion. Negative right straight leg raise test and negative left straight leg raise test.   Neurological:      Mental Status: She is alert.   Psychiatric:         Mood and Affect: Mood normal.       An electronic signature was used to authenticate this note.    --Ella Maldonado, APRCHRISTIAN - CNP

## 2024-05-24 LAB — BACTERIA UR CULT: NORMAL

## 2024-06-07 ENCOUNTER — PATIENT MESSAGE (OUTPATIENT)
Dept: FAMILY MEDICINE CLINIC | Age: 34
End: 2024-06-07

## 2024-06-13 RX ORDER — FLUOXETINE HYDROCHLORIDE 40 MG/1
80 CAPSULE ORAL DAILY
Qty: 60 CAPSULE | Refills: 3 | Status: SHIPPED | OUTPATIENT
Start: 2024-06-13

## 2024-07-05 ENCOUNTER — TELEPHONE (OUTPATIENT)
Dept: FAMILY MEDICINE CLINIC | Age: 34
End: 2024-07-05

## 2024-07-05 NOTE — TELEPHONE ENCOUNTER
Spoke with patient. Patient is self pay and didn't want to come in. Patient will CB on Monday if not better

## 2024-07-05 NOTE — TELEPHONE ENCOUNTER
Pt asking for blood work orders  Pt was on a road trip for 10 hrs on Wednesday and her feet and legs are swollen  Has pain in her right calf. She wants to make sure she does not have a blood clot.  Please call her cell [phone] or can call her work [phone].

## 2024-07-09 RX ORDER — FLUOXETINE 20 MG/1
TABLET, FILM COATED ORAL
Qty: 30 TABLET | Refills: 0 | OUTPATIENT
Start: 2024-07-09

## 2024-07-09 NOTE — TELEPHONE ENCOUNTER
Medication:   Requested Prescriptions     Pending Prescriptions Disp Refills    FLUoxetine (PROZAC) 20 MG tablet [Pharmacy Med Name: FLUoxetine HCl Oral Tablet 20 MG] 30 tablet 0     Sig: Take 1 tablet by mouth daily with 60 mg tablet       Last Filled:  6/13/24 to early has 3 refills    Patient Phone Number: [phone] (home)     Last appt: 5/23/2024   Next appt: Visit date not found    Last Labs DM:   Lab Results   Component Value Date/Time    LABA1C 5.3 04/07/2021 12:47 PM     Last Lipid:   Lab Results   Component Value Date/Time    CHOL 142 04/05/2023 09:21 AM    TRIG 175 04/05/2023 09:21 AM    HDL 35 04/05/2023 09:21 AM     Last PSA: No results found for: \"PSA\"  Last Thyroid:   Lab Results   Component Value Date/Time    TSH 0.94 04/22/2024 12:57 PM    TSH 0.20 12/14/2020 01:40 PM    D0LMLCE 1.21 12/14/2020 01:40 PM    T4FREE 1.6 03/09/2022 03:49 PM

## 2024-08-04 RX ORDER — FLUOXETINE 20 MG/1
80 TABLET, FILM COATED ORAL DAILY
Qty: 120 TABLET | Refills: 3 | Status: SHIPPED | OUTPATIENT
Start: 2024-08-04

## 2024-08-16 DIAGNOSIS — G47.9 DIFFICULTY SLEEPING: ICD-10-CM

## 2024-08-16 RX ORDER — TRAZODONE HYDROCHLORIDE 100 MG/1
100 TABLET ORAL NIGHTLY PRN
Qty: 90 TABLET | Refills: 1 | Status: SHIPPED | OUTPATIENT
Start: 2024-08-16

## 2024-08-16 RX ORDER — NIFEDIPINE 60 MG/1
60 TABLET, EXTENDED RELEASE ORAL DAILY
Qty: 90 TABLET | Refills: 0 | Status: SHIPPED | OUTPATIENT
Start: 2024-08-16

## 2024-08-16 NOTE — TELEPHONE ENCOUNTER
Medication:   Requested Prescriptions     Pending Prescriptions Disp Refills    NIFEdipine (PROCARDIA XL) 60 MG extended release tablet 90 tablet 0     Sig: Take 1 tablet by mouth daily    traZODone (DESYREL) 100 MG tablet 90 tablet 1     Sig: Take 1 tablet by mouth nightly as needed for Sleep       Last Filled:      Patient Phone Number: [phone] (home)     Last appt: 5/23/2024   Next appt: Visit date not found    Last BMP:   Lab Results   Component Value Date/Time     04/05/2023 09:21 AM    K 4.2 04/05/2023 09:21 AM     04/05/2023 09:21 AM    CO2 26 04/05/2023 09:21 AM    ANIONGAP 9 04/05/2023 09:21 AM    GLUCOSE 92 04/05/2023 09:21 AM    BUN 7 04/05/2023 09:21 AM    CREATININE 0.7 04/05/2023 09:21 AM    LABGLOM >60 04/05/2023 09:21 AM    GFRAA >60 03/09/2022 03:49 PM    CALCIUM 9.2 04/05/2023 09:21 AM      Last CMP:   Lab Results   Component Value Date/Time     04/05/2023 09:21 AM    K 4.2 04/05/2023 09:21 AM     04/05/2023 09:21 AM    CO2 26 04/05/2023 09:21 AM    ANIONGAP 9 04/05/2023 09:21 AM    GLUCOSE 92 04/05/2023 09:21 AM    BUN 7 04/05/2023 09:21 AM    CREATININE 0.7 04/05/2023 09:21 AM    LABGLOM >60 04/05/2023 09:21 AM    GFRAA >60 03/09/2022 03:49 PM    CALCIUM 9.2 04/05/2023 09:21 AM    AGRATIO 1.6 03/09/2022 03:49 PM    BILITOT 0.3 03/09/2022 03:49 PM    ALKPHOS 64 03/09/2022 03:49 PM    ALT 33 03/09/2022 03:49 PM    AST 23 03/09/2022 03:49 PM     Last Renal Function:   Lab Results   Component Value Date/Time     04/05/2023 09:21 AM    K 4.2 04/05/2023 09:21 AM     04/05/2023 09:21 AM    CO2 26 04/05/2023 09:21 AM    GLUCOSE 92 04/05/2023 09:21 AM    BUN 7 04/05/2023 09:21 AM    CREATININE 0.7 04/05/2023 09:21 AM    CALCIUM 9.2 04/05/2023 09:21 AM    GFRAA >60 03/09/2022 03:49 PM       Last OARRS:        No data to display                Preferred Pharmacy:   [id number] - [facility] [address] - P [phone] - F [phone]  [id number]

## 2024-11-12 ENCOUNTER — OFFICE VISIT (OUTPATIENT)
Dept: FAMILY MEDICINE CLINIC | Age: 34
End: 2024-11-12

## 2024-11-12 VITALS
SYSTOLIC BLOOD PRESSURE: 126 MMHG | BODY MASS INDEX: 51.91 KG/M2 | WEIGHT: 293 LBS | OXYGEN SATURATION: 99 % | DIASTOLIC BLOOD PRESSURE: 84 MMHG | HEIGHT: 63 IN | HEART RATE: 84 BPM

## 2024-11-12 DIAGNOSIS — I10 HYPERTENSION, UNSPECIFIED TYPE: Primary | ICD-10-CM

## 2024-11-12 DIAGNOSIS — G47.9 DIFFICULTY SLEEPING: ICD-10-CM

## 2024-11-12 DIAGNOSIS — E03.9 HYPOTHYROIDISM, UNSPECIFIED TYPE: ICD-10-CM

## 2024-11-12 DIAGNOSIS — F41.9 ANXIETY: ICD-10-CM

## 2024-11-12 PROCEDURE — 3074F SYST BP LT 130 MM HG: CPT | Performed by: NURSE PRACTITIONER

## 2024-11-12 PROCEDURE — 3079F DIAST BP 80-89 MM HG: CPT | Performed by: NURSE PRACTITIONER

## 2024-11-12 PROCEDURE — 99212 OFFICE O/P EST SF 10 MIN: CPT | Performed by: NURSE PRACTITIONER

## 2024-11-12 RX ORDER — NIFEDIPINE 60 MG/1
60 TABLET, EXTENDED RELEASE ORAL DAILY
Qty: 90 TABLET | Refills: 0 | Status: SHIPPED | OUTPATIENT
Start: 2024-11-12

## 2024-11-12 RX ORDER — LEVOTHYROXINE SODIUM 200 UG/1
200 TABLET ORAL DAILY
Qty: 90 TABLET | Refills: 1 | Status: SHIPPED | OUTPATIENT
Start: 2024-11-12

## 2024-11-12 RX ORDER — TRAZODONE HYDROCHLORIDE 100 MG/1
100 TABLET ORAL NIGHTLY PRN
Qty: 90 TABLET | Refills: 1 | Status: SHIPPED | OUTPATIENT
Start: 2024-11-12

## 2024-11-12 RX ORDER — LEVOTHYROXINE SODIUM 25 UG/1
TABLET ORAL
Qty: 45 TABLET | Refills: 1 | Status: SHIPPED | OUTPATIENT
Start: 2024-11-12

## 2024-11-12 RX ORDER — FLUOXETINE 20 MG/1
TABLET, FILM COATED ORAL
Qty: 49 TABLET | Refills: 0 | Status: SHIPPED | OUTPATIENT
Start: 2024-11-12

## 2024-11-12 NOTE — ASSESSMENT & PLAN NOTE
Not progressing;  Resent synthroid.  Recheck labs in 6-8 hours.    Orders:    levothyroxine (SYNTHROID) 200 MCG tablet; Take 1 tablet by mouth daily    levothyroxine (SYNTHROID) 25 MCG tablet; TAKE 1/2 TABLET BY MOUTH EVERY DAY    TSH; Future

## 2024-11-12 NOTE — PROGRESS NOTES
Hedy Khan (:  1990) is a 33 y.o. female,Established patient, here for evaluation of the following chief complaint(s):  Follow-up Chronic Condition       Assessment & Plan  Hypertension, unspecified type    Stable;  Continue current regimen.         Hypothyroidism, unspecified type   Not progressing;  Resent synthroid.  Recheck labs in 6-8 hours.    Orders:    levothyroxine (SYNTHROID) 200 MCG tablet; Take 1 tablet by mouth daily    levothyroxine (SYNTHROID) 25 MCG tablet; TAKE 1/2 TABLET BY MOUTH EVERY DAY    TSH; Future    Anxiety   Not progressing;  Restart prozac.  Risks and benefits discussed.    Orders:    FLUoxetine (PROZAC) 20 MG tablet; Take 20 mg daily x 1 week then increase to 40 mg daily x 1 week and then increase to 60 mg daily x 1 week and then increase to 80 mg    traZODone (DESYREL) 100 MG tablet; Take 1 tablet by mouth nightly as needed for Sleep    Difficulty sleeping    Stable;  Continue current regimen.  Discussed a good sleep hygiene.    Orders:    traZODone (DESYREL) 100 MG tablet; Take 1 tablet by mouth nightly as needed for Sleep      No follow-ups on file.       Subjective   HPI  HYPERTENSION  BP good at visit  On procardia- is good, no SE of medication    Hypothyroidism  Is good as far as she knows  On synthroid- has been out of one    Anxiety  Has been through the roof- was good without x 3 months  Hit like a ton of bricks  When on cycle is so emotional (sad, little things make her cry)- is new within 6 months  On prozac    Difficulty sleeping  Continues to have  Can fall asleep, has trouble staying asleep  10pm- goes to sleep; wakes up at 3-3:30 to go to the bathroom then has trouble falling back asleep  ? Mind wanders- cannot turn it off  On trazodone  Consider vistaril    NEXT MONTH WILL GIVE 2-40 mg Prozac    Review of Systems       Objective   Physical Exam  Constitutional:       Appearance: Normal appearance.   HENT:      Head: Normocephalic.      Right Ear:

## 2024-11-12 NOTE — ASSESSMENT & PLAN NOTE
Not progressing;  Restart prozac.  Risks and benefits discussed.    Orders:    FLUoxetine (PROZAC) 20 MG tablet; Take 20 mg daily x 1 week then increase to 40 mg daily x 1 week and then increase to 60 mg daily x 1 week and then increase to 80 mg    traZODone (DESYREL) 100 MG tablet; Take 1 tablet by mouth nightly as needed for Sleep

## 2025-01-03 DIAGNOSIS — F41.9 ANXIETY: ICD-10-CM

## 2025-01-03 RX ORDER — FLUOXETINE 20 MG/1
TABLET, FILM COATED ORAL
Qty: 49 TABLET | Refills: 0 | Status: SHIPPED | OUTPATIENT
Start: 2025-01-03

## 2025-01-03 NOTE — TELEPHONE ENCOUNTER
Medication:   Requested Prescriptions     Pending Prescriptions Disp Refills    FLUoxetine (PROZAC) 20 MG tablet 49 tablet 0     Sig: Take 20 mg daily x 1 week then increase to 40 mg daily x 1 week and then increase to 60 mg daily x 1 week and then increase to 80 mg       Last Filled:  11/12/24    Patient Phone Number: [phone] (home)     Last appt: 11/12/2024   Next appt: Visit date not found    Last Labs DM:   Lab Results   Component Value Date/Time    LABA1C 5.3 04/07/2021 12:47 PM     Last Lipid:   Lab Results   Component Value Date/Time    CHOL 142 04/05/2023 09:21 AM    TRIG 175 04/05/2023 09:21 AM    HDL 35 04/05/2023 09:21 AM     Last PSA: No results found for: \"PSA\"  Last Thyroid:   Lab Results   Component Value Date/Time    TSH 0.94 04/22/2024 12:57 PM    TSH 0.20 12/14/2020 01:40 PM    T0NUSET 1.21 12/14/2020 01:40 PM    T4FREE 1.6 03/09/2022 03:49 PM

## 2025-01-15 ENCOUNTER — PATIENT MESSAGE (OUTPATIENT)
Dept: FAMILY MEDICINE CLINIC | Age: 35
End: 2025-01-15

## 2025-01-15 DIAGNOSIS — F41.9 ANXIETY: ICD-10-CM

## 2025-01-29 NOTE — TELEPHONE ENCOUNTER
Patient called and stated that her pharmacy said they never received her prescription. She would like someone to call her back so she can figure out what's going on. I informed her they we were unable to get through on her phone number, she confirmed the number we have on file is correct and asked that her call be returned on her phone [phone]

## 2025-01-30 RX ORDER — FLUOXETINE 40 MG/1
80 CAPSULE ORAL DAILY
Qty: 60 CAPSULE | Refills: 3 | Status: SHIPPED | OUTPATIENT
Start: 2025-01-30

## 2025-01-30 NOTE — TELEPHONE ENCOUNTER
Medication:   Requested Prescriptions     Pending Prescriptions Disp Refills    FLUoxetine (PROZAC) 20 MG tablet 49 tablet 0     Sig: Take 20 mg daily x 1 week then increase to 40 mg daily x 1 week and then increase to 60 mg daily x 1 week and then increase to 80 mg        Last Fill   original script sent 1/3 , should dose have been increased to 80     Patient Phone Number: [phone] (home)     Last appt: 11/12/2024   Next appt: Visit date not found    Last OARRS:        No data to display

## 2025-02-03 ENCOUNTER — TELEPHONE (OUTPATIENT)
Dept: FAMILY MEDICINE CLINIC | Age: 35
End: 2025-02-03

## 2025-02-03 RX ORDER — FLUOXETINE 20 MG/1
80 TABLET, FILM COATED ORAL DAILY
Qty: 120 TABLET | Refills: 2 | Status: SHIPPED | OUTPATIENT
Start: 2025-02-03

## 2025-02-03 RX ORDER — FLUOXETINE 40 MG/1
80 CAPSULE ORAL DAILY
Qty: 60 CAPSULE | Refills: 3 | Status: CANCELLED | OUTPATIENT
Start: 2025-02-03

## 2025-02-03 NOTE — TELEPHONE ENCOUNTER
Patient called and stated that she picked up her Prozac proscription and didn't realize they were capsul's until she got home. Patient stated she can not swallow capsul's, only tablets. She also stated that she would like to get one 80mg tablet per day instead of two 40mg tablets if possible. She would like the new prescription called into   [id number] - [place], [place] - [address] - P [phone] - F [phone] [address] [address]  Phone: [phone]  Fax: [phone]

## 2025-02-05 ENCOUNTER — OFFICE VISIT (OUTPATIENT)
Dept: ORTHOPEDIC SURGERY | Age: 35
End: 2025-02-05

## 2025-02-05 VITALS — HEIGHT: 63 IN | WEIGHT: 293 LBS | BODY MASS INDEX: 51.91 KG/M2

## 2025-02-05 DIAGNOSIS — M25.552 LEFT HIP PAIN: Primary | ICD-10-CM

## 2025-02-05 DIAGNOSIS — M76.892 HIP FLEXOR TENDONITIS, LEFT: ICD-10-CM

## 2025-02-05 PROCEDURE — 99204 OFFICE O/P NEW MOD 45 MIN: CPT | Performed by: STUDENT IN AN ORGANIZED HEALTH CARE EDUCATION/TRAINING PROGRAM

## 2025-02-05 PROCEDURE — G0447 BEHAVIOR COUNSEL OBESITY 15M: HCPCS | Performed by: STUDENT IN AN ORGANIZED HEALTH CARE EDUCATION/TRAINING PROGRAM

## 2025-02-05 NOTE — PROGRESS NOTES
Dr Silver Brewer      Date /Time 2025       11:43 AM EST  Name Hedy Khan             1990   Location  [facility]  MRN [id number]                Chief Complaint   Patient presents with    New Patient     NP -Hip pain  No recent imaging        History of Present Illness    Hedy Khan is a 34 y.o. female who presents with a couple years of chronic anteriorly based left hip pain.  She describes a history of [place] these which was treated when she was less than 12 years old unsure how it was treated.  She endorses a leg length discrepancy from this.  She has not had any recent treatment for this hip issue.      Past History  Past Medical History:   Diagnosis Date    Anxiety     Depression     Genital HSV 10/25/2018    culture from genital lesion    Hypertension     Hypothyroidism      Past Surgical History:   Procedure Laterality Date    CARPAL TUNNEL RELEASE Right      SECTION       SECTION      CHOLECYSTECTOMY       Family History   Problem Relation Age of Onset    Obesity Mother     High Blood Pressure Mother     Heart Disease Father     Obesity Father     High Blood Pressure Father     Heart Disease Maternal Grandmother     Diabetes Maternal Grandmother     No Known Problems Maternal Grandfather     No Known Problems Paternal Grandmother     No Known Problems Paternal Grandfather     Breast Cancer Neg Hx     Ovarian Cancer Neg Hx      Social History     Tobacco Use    Smoking status: Never    Smokeless tobacco: Never   Substance Use Topics    Alcohol use: Yes      Current Outpatient Medications on File Prior to Visit   Medication Sig Dispense Refill    FLUoxetine (PROZAC) 20 MG tablet Take 4 tablets by mouth daily 120 tablet 2    levothyroxine (SYNTHROID) 200 MCG tablet Take 1 tablet by mouth daily 90 tablet 1    levothyroxine (SYNTHROID) 25 MCG tablet TAKE 1/2 TABLET BY MOUTH EVERY DAY 45 tablet 1    NIFEdipine (PROCARDIA XL) 60 MG extended release

## 2025-02-06 ENCOUNTER — OFFICE VISIT (OUTPATIENT)
Dept: FAMILY MEDICINE CLINIC | Age: 35
End: 2025-02-06

## 2025-02-06 VITALS
HEART RATE: 88 BPM | WEIGHT: 293 LBS | SYSTOLIC BLOOD PRESSURE: 118 MMHG | HEIGHT: 64 IN | DIASTOLIC BLOOD PRESSURE: 84 MMHG | OXYGEN SATURATION: 96 % | BODY MASS INDEX: 50.02 KG/M2

## 2025-02-06 DIAGNOSIS — F41.9 ANXIETY: Primary | ICD-10-CM

## 2025-02-06 DIAGNOSIS — E03.9 HYPOTHYROIDISM, UNSPECIFIED TYPE: ICD-10-CM

## 2025-02-06 PROCEDURE — 99212 OFFICE O/P EST SF 10 MIN: CPT | Performed by: NURSE PRACTITIONER

## 2025-02-06 RX ORDER — LEVOTHYROXINE SODIUM 200 UG/1
200 TABLET ORAL DAILY
Qty: 90 TABLET | Refills: 1 | Status: SHIPPED | OUTPATIENT
Start: 2025-02-06

## 2025-02-06 RX ORDER — LEVOTHYROXINE SODIUM 25 UG/1
TABLET ORAL
Qty: 45 TABLET | Refills: 1 | Status: SHIPPED | OUTPATIENT
Start: 2025-02-06

## 2025-02-06 RX ORDER — HYDROXYZINE PAMOATE 25 MG/1
25 CAPSULE ORAL 3 TIMES DAILY PRN
Qty: 90 CAPSULE | Refills: 1 | Status: SHIPPED | OUTPATIENT
Start: 2025-02-06

## 2025-02-06 SDOH — ECONOMIC STABILITY: FOOD INSECURITY: WITHIN THE PAST 12 MONTHS, THE FOOD YOU BOUGHT JUST DIDN'T LAST AND YOU DIDN'T HAVE MONEY TO GET MORE.: NEVER TRUE

## 2025-02-06 SDOH — ECONOMIC STABILITY: FOOD INSECURITY: WITHIN THE PAST 12 MONTHS, YOU WORRIED THAT YOUR FOOD WOULD RUN OUT BEFORE YOU GOT MONEY TO BUY MORE.: NEVER TRUE

## 2025-02-06 ASSESSMENT — PATIENT HEALTH QUESTIONNAIRE - PHQ9
1. LITTLE INTEREST OR PLEASURE IN DOING THINGS: NOT AT ALL
2. FEELING DOWN, DEPRESSED OR HOPELESS: SEVERAL DAYS
SUM OF ALL RESPONSES TO PHQ9 QUESTIONS 1 & 2: 1
SUM OF ALL RESPONSES TO PHQ QUESTIONS 1-9: 7
6. FEELING BAD ABOUT YOURSELF - OR THAT YOU ARE A FAILURE OR HAVE LET YOURSELF OR YOUR FAMILY DOWN: NOT AT ALL
SUM OF ALL RESPONSES TO PHQ QUESTIONS 1-9: 7
10. IF YOU CHECKED OFF ANY PROBLEMS, HOW DIFFICULT HAVE THESE PROBLEMS MADE IT FOR YOU TO DO YOUR WORK, TAKE CARE OF THINGS AT HOME, OR GET ALONG WITH OTHER PEOPLE: SOMEWHAT DIFFICULT
3. TROUBLE FALLING OR STAYING ASLEEP: MORE THAN HALF THE DAYS
7. TROUBLE CONCENTRATING ON THINGS, SUCH AS READING THE NEWSPAPER OR WATCHING TELEVISION: NOT AT ALL
8. MOVING OR SPEAKING SO SLOWLY THAT OTHER PEOPLE COULD HAVE NOTICED. OR THE OPPOSITE, BEING SO FIGETY OR RESTLESS THAT YOU HAVE BEEN MOVING AROUND A LOT MORE THAN USUAL: NOT AT ALL
9. THOUGHTS THAT YOU WOULD BE BETTER OFF DEAD, OR OF HURTING YOURSELF: NOT AT ALL
4. FEELING TIRED OR HAVING LITTLE ENERGY: MORE THAN HALF THE DAYS
5. POOR APPETITE OR OVEREATING: MORE THAN HALF THE DAYS
SUM OF ALL RESPONSES TO PHQ QUESTIONS 1-9: 7
SUM OF ALL RESPONSES TO PHQ QUESTIONS 1-9: 7

## 2025-02-06 NOTE — ASSESSMENT & PLAN NOTE
Stable;  Continue synthroid.    Orders:    levothyroxine (SYNTHROID) 200 MCG tablet; Take 1 tablet by mouth daily    levothyroxine (SYNTHROID) 25 MCG tablet; TAKE 1/2 TABLET BY MOUTH EVERY DAY

## 2025-02-06 NOTE — PROGRESS NOTES
Hedy Khan (:  1990) is a 34 y.o. female,Established patient, here for evaluation of the following chief complaint(s):  Anxiety         Assessment & Plan  Anxiety    Not progressing;  Encouraged patient to restart and take consistently.  Discussed starting on 20 mg x 1 week and then increasing to 40 mg x 1 week and then increasing to 60 mg x 1 week and then finally increasing to 80 mg.  Begin vistaril.  Risks and benefits discussed.  Referral for counseling.    Orders:    hydrOXYzine pamoate (VISTARIL) 25 MG capsule; Take 1 capsule by mouth 3 times daily as needed for Anxiety    [facility]ALLIE - Long, Giovany, SUSY, Counseling, [facility]    Hypothyroidism, unspecified type    Stable;  Continue synthroid.    Orders:    levothyroxine (SYNTHROID) 200 MCG tablet; Take 1 tablet by mouth daily    levothyroxine (SYNTHROID) 25 MCG tablet; TAKE 1/2 TABLET BY MOUTH EVERY DAY      Return in about 4 weeks (around 3/6/2025).       Subjective   HPI  Anxiety  Thinking of doom- gets scenarios in her mind; snow balls until gets int a mental breakdown, crying  Driving everyone nuts-  cannot live like this  Constantly needs reassurance, but even when gets reassurance it is still not enough  Has court today- adoption for nephews 18 and 15 yo  Prozac 80 mg- has not been on x 1 month; sent capsules- cannot swallow    Review of Systems       Objective   Physical Exam  Vitals reviewed.   Constitutional:       Appearance: Normal appearance.   HENT:      Head: Normocephalic.      Right Ear: External ear normal.      Left Ear: External ear normal.      Nose: Nose normal.   Pulmonary:      Effort: No respiratory distress.   Neurological:      Mental Status: She is alert.   Psychiatric:         Mood and Affect: Mood normal.       An electronic signature was used to authenticate this note.    --Ella Maldonado, APRN - CNP

## 2025-02-06 NOTE — ASSESSMENT & PLAN NOTE
Not progressing;  Encouraged patient to restart and take consistently.  Discussed starting on 20 mg x 1 week and then increasing to 40 mg x 1 week and then increasing to 60 mg x 1 week and then finally increasing to 80 mg.  Begin vistaril.  Risks and benefits discussed.  Referral for counseling.    Orders:    hydrOXYzine pamoate (VISTARIL) 25 MG capsule; Take 1 capsule by mouth 3 times daily as needed for Anxiety    SALLIE - Giovany Alves LISW-S, Counseling, [facility]

## 2025-02-24 DIAGNOSIS — F41.9 ANXIETY: ICD-10-CM

## 2025-02-24 DIAGNOSIS — G47.9 DIFFICULTY SLEEPING: ICD-10-CM

## 2025-02-24 RX ORDER — NIFEDIPINE 60 MG/1
60 TABLET, EXTENDED RELEASE ORAL DAILY
Qty: 90 TABLET | Refills: 0 | Status: SHIPPED | OUTPATIENT
Start: 2025-02-24

## 2025-02-24 RX ORDER — TRAZODONE HYDROCHLORIDE 100 MG/1
100 TABLET ORAL NIGHTLY PRN
Qty: 90 TABLET | Refills: 1 | Status: SHIPPED | OUTPATIENT
Start: 2025-02-24

## 2025-02-24 NOTE — TELEPHONE ENCOUNTER
Medication:   Requested Prescriptions     Pending Prescriptions Disp Refills    NIFEdipine (PROCARDIA XL) 60 MG extended release tablet 90 tablet 0     Sig: Take 1 tablet by mouth daily    traZODone (DESYREL) 100 MG tablet 90 tablet 1     Sig: Take 1 tablet by mouth nightly as needed for Sleep        Last Filled:  11/12/24    Pended to: [id number] [place], [place] - [address] - P [phone] - F [phone]     Patient Phone Number: [phone] (home)     Last appt: 2/6/2025   Next appt: Visit date not found    Last OARRS:        No data to display

## 2025-03-13 NOTE — TELEPHONE ENCOUNTER
Medication:   Requested Prescriptions     Pending Prescriptions Disp Refills    FLUoxetine (PROZAC) 20 MG tablet 120 tablet 2     Sig: Take 4 tablets by mouth daily        Last Filled:  2/3/25    Pended to: [facility] - [place] [id number] - P [phone] - F [phone]     Patient Phone Number: [phone] (home)     Last appt: 2/6/2025   Next appt: Visit date not found    Last OARRS:        No data to display

## 2025-03-14 RX ORDER — FLUOXETINE 20 MG/1
80 TABLET, FILM COATED ORAL DAILY
Qty: 120 TABLET | Refills: 2 | Status: SHIPPED | OUTPATIENT
Start: 2025-03-14

## 2025-03-20 DIAGNOSIS — E03.9 HYPOTHYROIDISM, UNSPECIFIED TYPE: ICD-10-CM

## 2025-03-21 RX ORDER — LEVOTHYROXINE SODIUM 25 UG/1
TABLET ORAL
Qty: 45 TABLET | Refills: 1 | Status: SHIPPED | OUTPATIENT
Start: 2025-03-21

## 2025-03-21 NOTE — TELEPHONE ENCOUNTER
Medication:   Requested Prescriptions     Pending Prescriptions Disp Refills    levothyroxine (SYNTHROID) 25 MCG tablet 45 tablet 1     Sig: TAKE 1/2 TABLET BY MOUTH EVERY DAY       Last Filled:  2/6/25    Patient Phone Number: [phone] (home)     Last appt: 2/6/2025   Next appt: Visit date not found    Last Labs DM:   Lab Results   Component Value Date/Time    LABA1C 5.3 04/07/2021 12:47 PM     Last Lipid:   Lab Results   Component Value Date/Time    CHOL 142 04/05/2023 09:21 AM    TRIG 175 04/05/2023 09:21 AM    HDL 35 04/05/2023 09:21 AM     Last PSA: No results found for: \"PSA\"  Last Thyroid:   Lab Results   Component Value Date/Time    TSH 0.94 04/22/2024 12:57 PM    TSH 0.20 12/14/2020 01:40 PM    Y8SBZHC 1.21 12/14/2020 01:40 PM    T4FREE 1.6 03/09/2022 03:49 PM

## 2025-04-23 RX ORDER — FLUOXETINE 20 MG/1
80 TABLET, FILM COATED ORAL DAILY
Qty: 120 TABLET | Refills: 2 | Status: SHIPPED | OUTPATIENT
Start: 2025-04-23

## 2025-04-23 NOTE — TELEPHONE ENCOUNTER
Medication:   Requested Prescriptions     Pending Prescriptions Disp Refills    FLUoxetine (PROZAC) 20 MG tablet 120 tablet 2     Sig: Take 4 tablets by mouth daily       Last Filled:  3/14/25    Patient Phone Number: [phone] (home)     Last appt: 2/6/2025   Next appt: Visit date not found    Last Labs DM:   Lab Results   Component Value Date/Time    LABA1C 5.3 04/07/2021 12:47 PM     Last Lipid:   Lab Results   Component Value Date/Time    CHOL 142 04/05/2023 09:21 AM    TRIG 175 04/05/2023 09:21 AM    HDL 35 04/05/2023 09:21 AM     Last PSA: No results found for: \"PSA\"  Last Thyroid:   Lab Results   Component Value Date/Time    TSH 0.94 04/22/2024 12:57 PM    TSH 0.20 12/14/2020 01:40 PM    X7TYZBE 1.21 12/14/2020 01:40 PM    T4FREE 1.6 03/09/2022 03:49 PM

## 2025-05-12 ENCOUNTER — HOSPITAL ENCOUNTER (OUTPATIENT)
Dept: GENERAL RADIOLOGY | Age: 35
Discharge: HOME | End: 2025-05-12

## 2025-05-12 ENCOUNTER — TELEMEDICINE (OUTPATIENT)
Dept: FAMILY MEDICINE CLINIC | Age: 35
End: 2025-05-12

## 2025-05-12 DIAGNOSIS — M25.562 ACUTE PAIN OF LEFT KNEE: Primary | ICD-10-CM

## 2025-05-12 DIAGNOSIS — M25.562 ACUTE PAIN OF LEFT KNEE: ICD-10-CM

## 2025-05-12 PROCEDURE — 73562 X-RAY EXAM OF KNEE 3: CPT

## 2025-05-12 PROCEDURE — 99212 OFFICE O/P EST SF 10 MIN: CPT | Performed by: NURSE PRACTITIONER

## 2025-05-12 RX ORDER — MELOXICAM 15 MG/1
15 TABLET ORAL DAILY
Qty: 30 TABLET | Refills: 3 | Status: SHIPPED | OUTPATIENT
Start: 2025-05-12

## 2025-05-12 NOTE — PROGRESS NOTES
Hedy Khan, was evaluated through a synchronous (real-time) audio-video encounter. The patient (or guardian if applicable) is aware that this is a billable service, which includes applicable co-pays. This Virtual Visit was conducted with patient's (and/or legal guardian's) consent. Patient identification was verified, and a caregiver was present when appropriate.   The patient was located at Other: work  Provider was located at [facility] [address] [place] [address]  Confirm you are appropriately licensed, registered, or certified to deliver care in the state where the patient is located as indicated above. If you are not or unsure, please re-schedule the visit: Yes, I confirm.     Hedy Khan (:  1990) is a Established patient, presenting virtually for evaluation of the following:    Below is the assessment and plan developed based on review of pertinent history, physical exam, labs, studies, and medications.     Assessment & Plan  Acute pain of left knee    Not progressing;  X-ray ordered.  Stop Ibuprofen and begin Mobic.  Encouraged ice, elevation and compression.    Orders:    meloxicam (MOBIC) 15 MG tablet; Take 1 tablet by mouth daily    XR KNEE LEFT (3 VIEWS); Future      No follow-ups on file.       Subjective   HPI  Left knee pain  No injuries  X 2 weeks- was working a little more than usual  It is really bad at night when sleeping  Worse behind knee  Feels like growing pains  No redness or swelling  ? Related to her weight  No swelling in lower leg  Worse walking and at night  No pain with bending  No pain with going up and down stairs  No pain with straightening  No weakness  Hx of Baker's cyst on right side  Ibuprofen- no improvement  Elevation- no improvement    Review of Systems       Objective   Patient-Reported Vitals  No data recorded     Physical Exam  Constitutional:       Appearance: Normal appearance.   HENT:      Head:

## 2025-05-15 ENCOUNTER — RESULTS FOLLOW-UP (OUTPATIENT)
Dept: FAMILY MEDICINE CLINIC | Age: 35
End: 2025-05-15

## 2025-05-27 DIAGNOSIS — F41.9 ANXIETY: ICD-10-CM

## 2025-05-27 DIAGNOSIS — E03.9 HYPOTHYROIDISM, UNSPECIFIED TYPE: ICD-10-CM

## 2025-05-27 DIAGNOSIS — G47.9 DIFFICULTY SLEEPING: ICD-10-CM

## 2025-05-27 RX ORDER — LEVOTHYROXINE SODIUM 25 UG/1
TABLET ORAL
Qty: 45 TABLET | Refills: 1 | Status: SHIPPED | OUTPATIENT
Start: 2025-05-27

## 2025-05-27 RX ORDER — TRAZODONE HYDROCHLORIDE 100 MG/1
100 TABLET ORAL NIGHTLY PRN
Qty: 90 TABLET | Refills: 1 | Status: SHIPPED | OUTPATIENT
Start: 2025-05-27

## 2025-05-27 RX ORDER — NIFEDIPINE 60 MG/1
60 TABLET, EXTENDED RELEASE ORAL DAILY
Qty: 90 TABLET | Refills: 0 | Status: SHIPPED | OUTPATIENT
Start: 2025-05-27

## 2025-05-27 RX ORDER — LEVOTHYROXINE SODIUM 200 UG/1
200 TABLET ORAL DAILY
Qty: 90 TABLET | Refills: 1 | Status: SHIPPED | OUTPATIENT
Start: 2025-05-27

## 2025-05-27 RX ORDER — FLUOXETINE 20 MG/1
80 TABLET, FILM COATED ORAL DAILY
Qty: 120 TABLET | Refills: 2 | Status: SHIPPED | OUTPATIENT
Start: 2025-05-27

## 2025-05-27 NOTE — TELEPHONE ENCOUNTER
Medication:   Requested Prescriptions     Pending Prescriptions Disp Refills    levothyroxine (SYNTHROID) 200 MCG tablet 90 tablet 1     Sig: Take 1 tablet by mouth daily    NIFEdipine (PROCARDIA XL) 60 MG extended release tablet 90 tablet 0     Sig: Take 1 tablet by mouth daily    traZODone (DESYREL) 100 MG tablet 90 tablet 1     Sig: Take 1 tablet by mouth nightly as needed for Sleep    levothyroxine (SYNTHROID) 25 MCG tablet 45 tablet 1     Sig: TAKE 1/2 TABLET BY MOUTH EVERY DAY    FLUoxetine (PROZAC) 20 MG tablet 120 tablet 2     Sig: Take 4 tablets by mouth daily       Last Filled:  2/24/25    Patient Phone Number: [phone] (home)     Last appt: 5/12/2025   Next appt: Visit date not found    Last Labs DM:   Lab Results   Component Value Date/Time    LABA1C 5.3 04/07/2021 12:47 PM     Last Lipid:   Lab Results   Component Value Date/Time    CHOL 142 04/05/2023 09:21 AM    TRIG 175 04/05/2023 09:21 AM    HDL 35 04/05/2023 09:21 AM     Last PSA: No results found for: \"PSA\"  Last Thyroid:   Lab Results   Component Value Date/Time    TSH 0.94 04/22/2024 12:57 PM    TSH 0.20 12/14/2020 01:40 PM    [id number] 1.21 12/14/2020 01:40 PM    T4FREE 1.6 03/09/2022 03:49 PM

## 2025-05-27 NOTE — TELEPHONE ENCOUNTER
Medication:   Requested Prescriptions     Pending Prescriptions Disp Refills    NIFEdipine (PROCARDIA XL) 60 MG extended release tablet [Pharmacy Med Name: NIFEdipine ER Osmotic Release Oral Tablet Extended Release 24 Hour 60 MG] 90 tablet 0     Sig: TAKE 1 TABLET BY MOUTH EVERY DAY       Last Filled:  2/24/25    Patient Phone Number: [phone] (home)     Last appt: 5/12/2025   Next appt: Visit date not found    Last Labs DM:   Lab Results   Component Value Date/Time    LABA1C 5.3 04/07/2021 12:47 PM     Last Lipid:   Lab Results   Component Value Date/Time    CHOL 142 04/05/2023 09:21 AM    TRIG 175 04/05/2023 09:21 AM    HDL 35 04/05/2023 09:21 AM     Last PSA: No results found for: \"PSA\"  Last Thyroid:   Lab Results   Component Value Date/Time    TSH 0.94 04/22/2024 12:57 PM    TSH 0.20 12/14/2020 01:40 PM    P4WIHXM 1.21 12/14/2020 01:40 PM    T4FREE 1.6 03/09/2022 03:49 PM

## 2025-05-29 ENCOUNTER — OFFICE VISIT (OUTPATIENT)
Dept: ORTHOPEDIC SURGERY | Age: 35
End: 2025-05-29

## 2025-05-29 VITALS — BODY MASS INDEX: 60.08 KG/M2 | WEIGHT: 293 LBS

## 2025-05-29 DIAGNOSIS — M25.562 POSTERIOR LEFT KNEE PAIN: Primary | ICD-10-CM

## 2025-05-29 DIAGNOSIS — M25.562 CHRONIC PAIN OF LEFT KNEE: ICD-10-CM

## 2025-05-29 DIAGNOSIS — G89.29 CHRONIC PAIN OF LEFT KNEE: ICD-10-CM

## 2025-05-29 DIAGNOSIS — S83.92XA SPRAIN OF LEFT KNEE, UNSPECIFIED LIGAMENT, INITIAL ENCOUNTER: ICD-10-CM

## 2025-05-29 RX ORDER — KETOROLAC TROMETHAMINE 10 MG/1
10 TABLET, FILM COATED ORAL
Qty: 15 TABLET | Refills: 0 | Status: SHIPPED | OUTPATIENT
Start: 2025-05-29

## 2025-05-29 NOTE — PROGRESS NOTES
Chief Complaint:   Chief Complaint   Patient presents with    Knee Pain     NP L KNEE. 1 month ago, started back of hamstring, wakes her up out of sleep, pain mainly at night. Conservative methods haven't really helped. No known injury.          History of Present Illness:       Patient is a 34 y.o. female presents with the above complaint.  She is seen in consultation at the request of Ella Maldonado CNP.  The symptoms began 1 monthsago and started without an injury. The patient describes a aching nocturnal pain that does not radiate.  The symptoms are intermittent  and a worsening since the onset.      Pain localizes to the back of the knee and does not seem to follow a typical patellofemoral provacative pattern.  There are not mechanical symptoms .  The patient denies subjective instability about the knee and admits to new onset weakness of the lower extremity.    Pain level 1    The patient denies a pattern of activity related swelling.      Treatment to date: NSAIDS meloxicam with no improvement.     There is no prior history of knee trauma. Workup has included  none    There is no prior history of autoimmune disease, inflammatory arthropathy, or crystal arthropathy.            Past Medical History:        Past Medical History:   Diagnosis Date    Anxiety     Depression     Genital HSV 10/25/2018    culture from genital lesion    Hypertension     Hypothyroidism             Present Medications:         Current Outpatient Medications   Medication Sig Dispense Refill    ketorolac (TORADOL) 10 MG tablet Take 1 tablet by mouth three times daily Discontinue all other NSAIDs during the course of treatment and resumed thereafter 15 tablet 0    levothyroxine (SYNTHROID) 200 MCG tablet Take 1 tablet by mouth daily 90 tablet 1    NIFEdipine (PROCARDIA XL) 60 MG extended release tablet Take 1 tablet by mouth daily 90 tablet 0    traZODone (DESYREL) 100 MG tablet Take 1 tablet by mouth nightly as needed for Sleep 90 tablet

## 2025-05-31 RX ORDER — NIFEDIPINE 60 MG/1
60 TABLET, EXTENDED RELEASE ORAL DAILY
Qty: 90 TABLET | Refills: 0 | OUTPATIENT
Start: 2025-05-31

## 2025-06-27 ENCOUNTER — TELEPHONE (OUTPATIENT)
Dept: ORTHOPEDIC SURGERY | Age: 35
End: 2025-06-27

## 2025-07-02 RX ORDER — NIFEDIPINE 60 MG/1
60 TABLET, EXTENDED RELEASE ORAL DAILY
Qty: 90 TABLET | Refills: 0 | Status: SHIPPED | OUTPATIENT
Start: 2025-07-02

## 2025-07-02 NOTE — TELEPHONE ENCOUNTER
Medication:   Requested Prescriptions     Pending Prescriptions Disp Refills    NIFEdipine (PROCARDIA XL) 60 MG extended release tablet [Pharmacy Med Name: NIFEdipine ER Osmotic Release Oral Tablet Extended Release 24 Hour 60 MG] 90 tablet 0     Sig: TAKE 1 TABLET BY MOUTH EVERY DAY       Last Filled: 5/27/25     Patient Phone Number: [phone] (home)     Last appt: 5/12/2025   Next appt: Visit date not found    Last Labs DM:   Lab Results   Component Value Date/Time    LABA1C 5.3 04/07/2021 12:47 PM     Last Lipid:   Lab Results   Component Value Date/Time    CHOL 142 04/05/2023 09:21 AM    TRIG 175 04/05/2023 09:21 AM    HDL 35 04/05/2023 09:21 AM     Last PSA: No results found for: \"PSA\"  Last Thyroid:   Lab Results   Component Value Date/Time    TSH 0.94 04/22/2024 12:57 PM    TSH 0.20 12/14/2020 01:40 PM    [id number] 1.21 12/14/2020 01:40 PM    T4FREE 1.6 03/09/2022 03:49 PM

## 2025-07-02 NOTE — TELEPHONE ENCOUNTER
Medication:   Requested Prescriptions     Pending Prescriptions Disp Refills    NIFEdipine (PROCARDIA XL) 60 MG extended release tablet 90 tablet 0     Sig: Take 1 tablet by mouth daily       Last Filled:  5/27/25    Patient Phone Number: [phone] (home)     Last appt: 5/12/2025   Next appt: Visit date not found    Last Labs DM:   Lab Results   Component Value Date/Time    LABA1C 5.3 04/07/2021 12:47 PM     Last Lipid:   Lab Results   Component Value Date/Time    CHOL 142 04/05/2023 09:21 AM    TRIG 175 04/05/2023 09:21 AM    HDL 35 04/05/2023 09:21 AM     Last PSA: No results found for: \"PSA\"  Last Thyroid:   Lab Results   Component Value Date/Time    TSH 0.94 04/22/2024 12:57 PM    TSH 0.20 12/14/2020 01:40 PM    [id number] 1.21 12/14/2020 01:40 PM    T4FREE 1.6 03/09/2022 03:49 PM

## 2025-07-08 RX ORDER — NIFEDIPINE 60 MG/1
60 TABLET, EXTENDED RELEASE ORAL DAILY
Qty: 90 TABLET | Refills: 0 | Status: SHIPPED | OUTPATIENT
Start: 2025-07-08